# Patient Record
Sex: MALE | Race: WHITE | NOT HISPANIC OR LATINO | Employment: FULL TIME | URBAN - METROPOLITAN AREA
[De-identification: names, ages, dates, MRNs, and addresses within clinical notes are randomized per-mention and may not be internally consistent; named-entity substitution may affect disease eponyms.]

---

## 2017-03-21 ENCOUNTER — GENERIC CONVERSION - ENCOUNTER (OUTPATIENT)
Dept: OTHER | Facility: OTHER | Age: 27
End: 2017-03-21

## 2017-04-04 ENCOUNTER — ALLSCRIPTS OFFICE VISIT (OUTPATIENT)
Dept: OTHER | Facility: OTHER | Age: 27
End: 2017-04-04

## 2017-04-11 RX ORDER — EZETIMIBE 10 MG/1
10 TABLET ORAL DAILY
COMMUNITY

## 2017-04-11 RX ORDER — FEXOFENADINE HCL 180 MG/1
180 TABLET ORAL DAILY
Status: ON HOLD | COMMUNITY
End: 2017-04-20 | Stop reason: ALTCHOICE

## 2017-04-11 RX ORDER — ATORVASTATIN CALCIUM 20 MG/1
20 TABLET, FILM COATED ORAL DAILY
Status: ON HOLD | COMMUNITY
End: 2017-04-20 | Stop reason: ALTCHOICE

## 2017-04-19 ENCOUNTER — ANESTHESIA EVENT (OUTPATIENT)
Dept: PERIOP | Facility: HOSPITAL | Age: 27
End: 2017-04-19
Payer: COMMERCIAL

## 2017-04-20 ENCOUNTER — ANESTHESIA (OUTPATIENT)
Dept: PERIOP | Facility: HOSPITAL | Age: 27
End: 2017-04-20
Payer: COMMERCIAL

## 2017-04-20 ENCOUNTER — GENERIC CONVERSION - ENCOUNTER (OUTPATIENT)
Dept: OTHER | Facility: OTHER | Age: 27
End: 2017-04-20

## 2017-04-20 ENCOUNTER — HOSPITAL ENCOUNTER (OUTPATIENT)
Facility: HOSPITAL | Age: 27
Setting detail: OUTPATIENT SURGERY
Discharge: HOME | End: 2017-04-20
Attending: INTERNAL MEDICINE | Admitting: INTERNAL MEDICINE
Payer: COMMERCIAL

## 2017-04-20 VITALS
HEIGHT: 62 IN | BODY MASS INDEX: 36.35 KG/M2 | OXYGEN SATURATION: 98 % | RESPIRATION RATE: 18 BRPM | HEART RATE: 69 BPM | WEIGHT: 197.53 LBS | DIASTOLIC BLOOD PRESSURE: 71 MMHG | TEMPERATURE: 97.9 F | SYSTOLIC BLOOD PRESSURE: 113 MMHG

## 2017-04-20 DIAGNOSIS — R10.84 GENERALIZED ABDOMINAL PAIN: ICD-10-CM

## 2017-04-20 DIAGNOSIS — R10.31 ABDOMINAL PAIN, RLQ: ICD-10-CM

## 2017-04-20 DIAGNOSIS — K21.9 GERD (GASTROESOPHAGEAL REFLUX DISEASE): ICD-10-CM

## 2017-04-20 PROCEDURE — 88342 IMHCHEM/IMCYTCHM 1ST ANTB: CPT | Performed by: INTERNAL MEDICINE

## 2017-04-20 PROCEDURE — 88305 TISSUE EXAM BY PATHOLOGIST: CPT | Performed by: INTERNAL MEDICINE

## 2017-04-20 RX ORDER — SODIUM CHLORIDE, SODIUM LACTATE, POTASSIUM CHLORIDE, CALCIUM CHLORIDE 600; 310; 30; 20 MG/100ML; MG/100ML; MG/100ML; MG/100ML
125 INJECTION, SOLUTION INTRAVENOUS CONTINUOUS
Status: DISCONTINUED | OUTPATIENT
Start: 2017-04-20 | End: 2017-04-20 | Stop reason: HOSPADM

## 2017-04-20 RX ORDER — LIDOCAINE HYDROCHLORIDE 10 MG/ML
INJECTION, SOLUTION INFILTRATION; PERINEURAL AS NEEDED
Status: DISCONTINUED | OUTPATIENT
Start: 2017-04-20 | End: 2017-04-20 | Stop reason: SURG

## 2017-04-20 RX ORDER — PROPOFOL 10 MG/ML
INJECTION, EMULSION INTRAVENOUS AS NEEDED
Status: DISCONTINUED | OUTPATIENT
Start: 2017-04-20 | End: 2017-04-20 | Stop reason: SURG

## 2017-04-20 RX ORDER — PANTOPRAZOLE SODIUM 40 MG/1
40 TABLET, DELAYED RELEASE ORAL DAILY
COMMUNITY
End: 2018-03-23 | Stop reason: SDUPTHER

## 2017-04-20 RX ADMIN — PROPOFOL 50 MG: 10 INJECTION, EMULSION INTRAVENOUS at 12:18

## 2017-04-20 RX ADMIN — PROPOFOL 50 MG: 10 INJECTION, EMULSION INTRAVENOUS at 12:21

## 2017-04-20 RX ADMIN — PROPOFOL 50 MG: 10 INJECTION, EMULSION INTRAVENOUS at 12:15

## 2017-04-20 RX ADMIN — SODIUM CHLORIDE, SODIUM LACTATE, POTASSIUM CHLORIDE, AND CALCIUM CHLORIDE 125 ML/HR: .6; .31; .03; .02 INJECTION, SOLUTION INTRAVENOUS at 11:16

## 2017-04-20 RX ADMIN — PROPOFOL 150 MG: 10 INJECTION, EMULSION INTRAVENOUS at 12:09

## 2017-04-20 RX ADMIN — LIDOCAINE HYDROCHLORIDE 50 MG: 10 INJECTION, SOLUTION INFILTRATION; PERINEURAL at 12:09

## 2017-04-20 RX ADMIN — PROPOFOL 50 MG: 10 INJECTION, EMULSION INTRAVENOUS at 12:12

## 2017-04-25 ENCOUNTER — GENERIC CONVERSION - ENCOUNTER (OUTPATIENT)
Dept: OTHER | Facility: OTHER | Age: 27
End: 2017-04-25

## 2017-06-19 ENCOUNTER — ALLSCRIPTS OFFICE VISIT (OUTPATIENT)
Dept: OTHER | Facility: OTHER | Age: 27
End: 2017-06-19

## 2017-08-22 ENCOUNTER — ALLSCRIPTS OFFICE VISIT (OUTPATIENT)
Dept: OTHER | Facility: OTHER | Age: 27
End: 2017-08-22

## 2017-08-22 ENCOUNTER — GENERIC CONVERSION - ENCOUNTER (OUTPATIENT)
Dept: OTHER | Facility: OTHER | Age: 27
End: 2017-08-22

## 2017-08-27 LAB
Lab: 4.8 UG/ML
Lab: <25 NG/ML

## 2017-08-28 ENCOUNTER — GENERIC CONVERSION - ENCOUNTER (OUTPATIENT)
Dept: OTHER | Facility: OTHER | Age: 27
End: 2017-08-28

## 2017-11-22 ENCOUNTER — GENERIC CONVERSION - ENCOUNTER (OUTPATIENT)
Dept: OTHER | Facility: OTHER | Age: 27
End: 2017-11-22

## 2018-01-11 NOTE — RESULT NOTES
Verified Results  (1) TISSUE EXAM 20Apr2017 12:11PM Aide Turpin     Test Name Result Flag Reference   LAB AP CASE REPORT (Report)     Surgical Pathology Report             Case: [id number]                   Authorizing Provider: Molly Calderon MD  Collected:      04/20/2017 1211        Ordering Location:   [address] [place] [address] Received:      04/20/2017 1241                    Operating Room                                 Pathologist:      Sander Pearl MD                                Specimens:  A) - Duodenum, Duodenum, r/o celiac                                  B) - Stomach, Stomach, r/o H pylori                                  C) - Ileum, ileostomy stoma, Terminal ileum, cold bx, r/o crohns   LAB AP FINAL DIAGNOSIS (Report)     A  Duodenum, Duodenum, r/o celiac biopsy:   -Benign small intestinal mucosa with intact villi and no evidence of   increased    intraepithelial lymphocytes   -No evidence of dysplasia or malignancy  B  Stomach, Stomach, r/o H pylori biopsy:  -Benign gastric-oxyntoantral type mucosa with mild chronic inactive   gastritis   -No evidence of Paneth cell metaplasia seen   -No evidence of dysplasia or malignancy   -No H Pylori organisms identified on immunohistochemical stained slide  Control reacted appropriately        C  Ileum, ileostomy stoma, Terminal ileum, cold bx, r/o crohn's biopsy:   -Chronic active ileitis with marked chronic inflammation of lamina   propria, focal cryptitis and crypt architecture distortion   -No evidence of granuloma, dysplasia or malignancy  Clinical correlation is recommended  Electronically signed by Sander Pearl MD on 4/24/2017 at 12:35 PM   LAB AP NOTE      Interpretation performed at [address] [place] [address]   LAB [address] [facility] [address]  (Report)     These tests were developed and their performance characteristics   determined by [facility] or [facility]  They may not be cleared or approved by the [place]  Food and   Drug Administration  The FDA has determined that such clearance or   approval is not necessary  These tests are used for clinical purposes  They should not be regarded as investigational or for research  This   laboratory has been approved by CLIA 88, designated as a high-complexity   laboratory and is qualified to perform these tests  LAB AP GROSS DESCRIPTION (Report)     A  The specimen is received in formalin, labeled with the patient's name   and hospital number, and is designated duodenum, rule out celiac, is a   single irregularly shaped fragment of tan soft tissue measuring 0 5 cm in   greatest dimension  Entirely submitted  One cassette  B  The specimen is received in formalin, labeled with the patient's name   and hospital number, and is designated stomach, rule out H  pylori, are   multiple irregularly shaped fragments of tan soft tissue measuring 0 7 x   0 5 x 0 1 cm in aggregate  Entirely submitted  One cassette  C  The specimen is received in formalin, labeled with the patient's name   and hospital number, and is designated terminal ileum, cold biopsy, rule   out Crohn's, are two irregularly shaped fragments of tan soft tissue   measuring 0 5 and 0 4 cm in greatest dimension  Entirely submitted  One   cassette  Note: The estimated total formalin fixation time based upon information   provided by the submitting clinician and the standard processing schedule   is 25 75 hours        RLR   LAB AP CLINICAL INFORMATION Cold bx r/o celiac     Cold bx r/o celiac

## 2018-01-12 VITALS
DIASTOLIC BLOOD PRESSURE: 70 MMHG | SYSTOLIC BLOOD PRESSURE: 115 MMHG | HEART RATE: 80 BPM | WEIGHT: 204.38 LBS | BODY MASS INDEX: 37.61 KG/M2 | HEIGHT: 62 IN

## 2018-01-12 VITALS
SYSTOLIC BLOOD PRESSURE: 100 MMHG | HEART RATE: 80 BPM | HEIGHT: 62 IN | DIASTOLIC BLOOD PRESSURE: 72 MMHG | WEIGHT: 203.38 LBS | BODY MASS INDEX: 37.43 KG/M2

## 2018-01-13 VITALS
HEIGHT: 62 IN | SYSTOLIC BLOOD PRESSURE: 110 MMHG | BODY MASS INDEX: 37.17 KG/M2 | WEIGHT: 202 LBS | DIASTOLIC BLOOD PRESSURE: 72 MMHG

## 2018-01-18 NOTE — RESULT NOTES
Verified Results  (LC) Adalimumab + Ab (Serial Monitor) [id number] 01:55PM Ruby Escobar     Test Name Result Flag Reference   Anti- Adalimumab Antibody <25 ng/mL     Interpretation:  Anti-adalimumab antibodies are UNDETECTED  Quantitation Limit: <25 ng/mL  Results of 25 or higher indicate detection of anti-  adalimumab antibodies  25 - 100 ng/mL: LOW antibody titer, little/no apparent                        reduction in free drug level  101 - 300 ng/mL: INTERMEDIATE antibody titer, variable                        reduction in free drug level  301 or greater ng/mL: HIGH antibody titer, notably                        diminished or absent free drug level  Comments:  - Anti-drug antibodies may develop at any time during the    course of biological therapy  - Low titer anti-drug antibodies may have little or no    effect on drug levels or efficacy  Antibodies in low    titers may be transient and disappear overtime (5,6) or    they may progress to higher titers (6)  - High titers are likely to be more consequential, leading    to loss of drug efficacy by preventing drug binding to    TNF and/or increasing drug clearance (6,7)  - This anti-adalimumab antibody assay is not impeded by the    presence of adalimumab in serum and all positive antibody    results are verified by a confirmatory test   References:  1  Michelle DIA, et al  Clin Gastroenterol Hepatol 0751;36:60-     80   2  Mazor Y, et al  Aliment Pharmacol Ther 8710;94:174-040  3  Krysta E, et al  J Crohns Col 2016;10(5):510-515  4  Laurel PIERSON, et al  Jewelene Trish Rheum Dis 9993;85:362-617   5  Blaise C, et al  Inflamm Bowel Dis 2012;18(12):2209-     2217   6  Sue Vieira et al  Am J Gastroenterol 6998;653:971-     [facility] et al  Clin Gastroenterol Hepatol 3172;99(6):     375-369    These tests were developed and their performance  characteristics determined by [facility]   They have not been  cleared or approved by the Food and Drug Administration  However, both drug and anti-drug antibody assays have been  developed and validated in accordance with FDA Guidance  for Industry documents: Bioanalytical Method Validation  (2013) and Assay Development and Validation for  Immunogenicity Testing of Therapeutic Protein Products  (2016)  Adalimumab Drug Level 4 8 ug/mL     In the presence of serum anti-adalimumab antibodies, the  adalimumab drug level reflects the antibody-unbound (free)  fraction of adalimumab in serum  Quantitation Limit: <0 6 ug/mL  Results of 0 6 or higher indicate detection of adalimumab  COMMENTS:  - The optimal drug concentration depends upon patient-    specific factors including the disease and desired    therapeutic endpoint   - Target trough concentrations > 4 9 and > 5 9 ug/mL have    been studied in inflammatory bowel disease    (1,2)  - A trough level > 8 14 ug/mL has been proposed for mucosal    healing in CD (3)  - In rheumatoid arthritis, trough levels of 5 to 8 ug/mL    are associated with clinical response (EULAR)(4)

## 2018-01-22 VITALS
HEART RATE: 84 BPM | WEIGHT: 205.25 LBS | BODY MASS INDEX: 37.77 KG/M2 | SYSTOLIC BLOOD PRESSURE: 110 MMHG | HEIGHT: 62 IN | DIASTOLIC BLOOD PRESSURE: 78 MMHG

## 2018-02-08 ENCOUNTER — TELEPHONE (OUTPATIENT)
Dept: GASTROENTEROLOGY | Facility: CLINIC | Age: 28
End: 2018-02-08

## 2018-03-12 RX ORDER — PREDNISONE 20 MG/1
1 TABLET ORAL DAILY
COMMUNITY
Start: 2017-06-19 | End: 2018-03-15

## 2018-03-12 RX ORDER — HYDROCODONE BITARTRATE AND ACETAMINOPHEN 7.5; 3 MG/1; MG/1
TABLET ORAL
Refills: 0 | COMMUNITY
Start: 2018-02-20 | End: 2022-01-13

## 2018-03-12 RX ORDER — FEXOFENADINE HYDROCHLORIDE 60 MG/1
TABLET, FILM COATED ORAL AS NEEDED
COMMUNITY

## 2018-03-15 ENCOUNTER — OFFICE VISIT (OUTPATIENT)
Dept: GASTROENTEROLOGY | Facility: CLINIC | Age: 28
End: 2018-03-15
Payer: COMMERCIAL

## 2018-03-15 VITALS
DIASTOLIC BLOOD PRESSURE: 88 MMHG | WEIGHT: 208.13 LBS | SYSTOLIC BLOOD PRESSURE: 112 MMHG | BODY MASS INDEX: 38.07 KG/M2 | HEART RATE: 84 BPM

## 2018-03-15 DIAGNOSIS — K21.9 GERD WITHOUT ESOPHAGITIS: ICD-10-CM

## 2018-03-15 DIAGNOSIS — Z09 FOLLOW UP: Primary | ICD-10-CM

## 2018-03-15 DIAGNOSIS — K50.018 CROHN'S DISEASE OF ILEUM, OTHER COMPLICATION (HCC): ICD-10-CM

## 2018-03-15 PROCEDURE — 99214 OFFICE O/P EST MOD 30 MIN: CPT | Performed by: INTERNAL MEDICINE

## 2018-03-15 NOTE — PROGRESS NOTES
Follow-up Note -  [facility]  Alex Rojas 1990 28 y o  male     ASSESSMENT @ PLAN:  He is a 30-year-old male  With small bowel Crohn's who was in remission and doing very well on his medication regimen  In addition he has stable gastroesophageal reflux disease on Protonix  He does have food triggers  He had mild irritation after shoulder surgery  1 he will continue on the adalimumab shot weekly    2 he will continue on the Protonix 40 mg daily  He is off steroids and is doing well and is stable and he will come back in 6 months  Reason:  Crohn's disease and acid reflux    HPI:  He is a 58-year-old male with gastroesophageal reflux disease and small bowel Crohn's who was doing very well  He is on the adalimumab shot every week and going from every other week to every week is helped him tremendously  He has less abdominal pain and more normal bowel movements  He has no melena or hematochezia  He has had mild increase in abdominal pain since having his shoulder surgery 3 weeks ago  He has no fevers chills nausea vomiting heartburn or dysphagia  He is on the pantoprazole  His reflux is under control  He clearly has food triggers  His weight is stable  In fact he is obese and we went over this at length  He is here with his mother  REVIEW OF SYSTEMS:    CONSTITUTIONAL: Denies any fever, chills, or rigors  Good appetite, and no recent weight loss  HEENT: No earache or tinnitus  Denies hearing loss or visual disturbances  CARDIOVASCULAR: No chest pain or palpitations  RESPIRATORY: Denies any cough, hemoptysis, shortness of breath or dyspnea on exertion  GASTROINTESTINAL: As noted in the History of Present Illness  GENITOURINARY: No problems with urination  Denies any hematuria or dysuria  NEUROLOGIC: No dizziness or vertigo, denies headaches  MUSCULOSKELETAL: Denies any muscle or joint pain  SKIN: Denies skin rashes or itching     ENDOCRINE: Denies excessive thirst  Denies intolerance to heat or cold  PSYCHOSOCIAL: Denies depression or anxiety  Denies any recent memory loss  Past Medical History:   Diagnosis Date    GERD (gastroesophageal reflux disease)     H/O angina pectoris     Hyperlipidemia     Sleep apnea       Past Surgical History:   Procedure Laterality Date    CARPAL TUNNEL RELEASE      CHOLECYSTECTOMY      HERNIA REPAIR      [place] ESOPHAGOGASTRODUODENOSCOPY TRANSORAL DIAGNOSTIC N/A 4/20/2017    Procedure: EGD AND COLONOSCOPY;  Surgeon: Timbo Mccabe MD;  Location: [facility]; Service: Gastroenterology    SHOULDER SURGERY Right      Social History     Social History    Marital status: Single     Spouse name: N/A    Number of children: N/A    Years of education: N/A     Occupational History    Not on file  Social History Main Topics    Smoking status: Never Smoker    Smokeless tobacco: Never Used    Alcohol use No    Drug use: No    Sexual activity: Not on file     Other Topics Concern    Not on file     Social History Narrative    No narrative on file     Family History   Problem Relation Age of Onset    Diabetes Mother      Aluminum; Bee venom; Ciprofloxacin; Clindamycin; Doxycycline; and Percocet [oxycodone-acetaminophen]  Current Outpatient Prescriptions   Medication Sig Dispense Refill    Adalimumab (HUMIRA PEN) 40 MG/0 8ML PNKT Inject under the skin      ezetimibe (ZETIA) 10 mg tablet Take 10 mg by mouth daily      fexofenadine (ALLEGRA) 60 MG tablet Take by mouth      pantoprazole (PROTONIX) 40 mg tablet Take 40 mg by mouth daily      HYDROcodone-acetaminophen (XODOL) 7 5-300 MG per tablet TAKE 1 TABLET EVERY FOUR TO SIX HOURS AS NEEDED  0    predniSONE 20 mg tablet Take 1 tablet by mouth daily       No current facility-administered medications for this visit  Blood pressure 112/88, pulse 84, weight 94 4 kg (208 lb 2 oz)      PHYSICAL EXAM:     General Appearance:   Alert, cooperative, no distress, appears stated age    HEENT:   Normocephalic, atraumatic, anicteric      Neck:  Supple, symmetrical, trachea midline, no adenopathy;    thyroid: no enlargement/tenderness/nodules; no carotid  bruit or JVD    Lungs:   Clear to auscultation bilaterally; no rales, rhonchi or wheezing; respirations unlabored    Heart[de-identified]   S1 and S2 normal; regular rate and rhythm; no murmur, rub, or gallop     Abdomen:   Soft, non-tender, non-distended; normal bowel sounds; no masses, no organomegaly    Genitalia:   Deferred    Rectal:   Deferred    Extremities:  No cyanosis, clubbing or edema    Pulses:  2+ and symmetric all extremities    Skin:  Skin color, texture, turgor normal, no rashes or lesions    Lymph nodes:  No palpable cervical, axillary or inguinal lymphadenopathy        No results found for: WBC, HGB, HCT, MCV, PLT  No results found for: GLUCOSE, CALCIUM, NA, K, CO2, CL, BUN, CREATININE  No results found for: ALT, AST, GGT, ALKPHOS, BILITOT  No results found for: INR, PROTIME

## 2018-03-15 NOTE — LETTER
March 15, 2018     Monique Vega, [address]  [facility] [address]    Patient: Susy Neumann   YOB: 1990   Date of Visit: 3/15/2018       Dear Dr Yomaira Marvin:    Thank you for referring Chase Barriga to me for evaluation  Below are my notes for this consultation  If you have questions, please do not hesitate to call me  I look forward to following your patient along with you  Sincerely,        Cherie Alcazar MD        CC: No Recipients  Cherie Alcazar MD  3/15/2018  2:39 PM  Sign at close encounter  Follow-up Note - [address] [facility]  Susy Neumann 1990 28 y o  male     ASSESSMENT @ PLAN:  He is a 79-year-old male  With small bowel Crohn's who was in remission and doing very well on his medication regimen  In addition he has stable gastroesophageal reflux disease on Protonix  He does have food triggers  He had mild irritation after shoulder surgery  1 he will continue on the adalimumab shot weekly    2 he will continue on the Protonix 40 mg daily  He is off steroids and is doing well and is stable and he will come back in 6 months  Reason:  Crohn's disease and acid reflux    HPI:  He is a 79-year-old male with gastroesophageal reflux disease and small bowel Crohn's who was doing very well  He is on the adalimumab shot every week and going from every other week to every week is helped him tremendously  He has less abdominal pain and more normal bowel movements  He has no melena or hematochezia  He has had mild increase in abdominal pain since having his shoulder surgery 3 weeks ago  He has no fevers chills nausea vomiting heartburn or dysphagia  He is on the pantoprazole  His reflux is under control  He clearly has food triggers  His weight is stable  In fact he is obese and we went over this at length  He is here with his mother  REVIEW OF SYSTEMS:    CONSTITUTIONAL: Denies any fever, chills, or rigors   Good appetite, and no recent weight loss  HEENT: No earache or tinnitus  Denies hearing loss or visual disturbances  CARDIOVASCULAR: No chest pain or palpitations  RESPIRATORY: Denies any cough, hemoptysis, shortness of breath or dyspnea on exertion  GASTROINTESTINAL: As noted in the History of Present Illness  GENITOURINARY: No problems with urination  Denies any hematuria or dysuria  NEUROLOGIC: No dizziness or vertigo, denies headaches  MUSCULOSKELETAL: Denies any muscle or joint pain  SKIN: Denies skin rashes or itching  ENDOCRINE: Denies excessive thirst  Denies intolerance to heat or cold  PSYCHOSOCIAL: Denies depression or anxiety  Denies any recent memory loss  Past Medical History:   Diagnosis Date    GERD (gastroesophageal reflux disease)     H/O angina pectoris     Hyperlipidemia     Sleep apnea       Past Surgical History:   Procedure Laterality Date    CARPAL TUNNEL RELEASE      CHOLECYSTECTOMY      HERNIA REPAIR      [place] ESOPHAGOGASTRODUODENOSCOPY TRANSORAL DIAGNOSTIC N/A 4/20/2017    Procedure: EGD AND COLONOSCOPY;  Surgeon: Sharon Hendrix MD;  Location: [facility]; Service: Gastroenterology    SHOULDER SURGERY Right      Social History     Social History    Marital status: Single     Spouse name: N/A    Number of children: N/A    Years of education: N/A     Occupational History    Not on file  Social History Main Topics    Smoking status: Never Smoker    Smokeless tobacco: Never Used    Alcohol use No    Drug use: No    Sexual activity: Not on file     Other Topics Concern    Not on file     Social History Narrative    No narrative on file     Family History   Problem Relation Age of Onset    Diabetes Mother      Aluminum; Bee venom; Ciprofloxacin; Clindamycin;  Doxycycline; and Percocet [oxycodone-acetaminophen]  Current Outpatient Prescriptions   Medication Sig Dispense Refill    Adalimumab (HUMIRA PEN) 40 MG/0 8ML PNKT Inject under the skin      ezetimibe (ZETIA) 10 mg tablet Take 10 mg by mouth daily      fexofenadine (ALLEGRA) 60 MG tablet Take by mouth      pantoprazole (PROTONIX) 40 mg tablet Take 40 mg by mouth daily      HYDROcodone-acetaminophen (XODOL) 7 5-300 MG per tablet TAKE 1 TABLET EVERY FOUR TO SIX HOURS AS NEEDED  0    predniSONE 20 mg tablet Take 1 tablet by mouth daily       No current facility-administered medications for this visit  Blood pressure 112/88, pulse 84, weight 94 4 kg (208 lb 2 oz)  PHYSICAL EXAM:     General Appearance:   Alert, cooperative, no distress, appears stated age    HEENT:   Normocephalic, atraumatic, anicteric      Neck:  Supple, symmetrical, trachea midline, no adenopathy;    thyroid: no enlargement/tenderness/nodules; no carotid  bruit or JVD    Lungs:   Clear to auscultation bilaterally; no rales, rhonchi or wheezing; respirations unlabored    Heart[de-identified]   S1 and S2 normal; regular rate and rhythm; no murmur, rub, or gallop     Abdomen:   Soft, non-tender, non-distended; normal bowel sounds; no masses, no organomegaly    Genitalia:   Deferred    Rectal:   Deferred    Extremities:  No cyanosis, clubbing or edema    Pulses:  2+ and symmetric all extremities    Skin:  Skin color, texture, turgor normal, no rashes or lesions    Lymph nodes:  No palpable cervical, axillary or inguinal lymphadenopathy        No results found for: WBC, HGB, HCT, MCV, PLT  No results found for: GLUCOSE, CALCIUM, NA, K, CO2, CL, BUN, CREATININE  No results found for: ALT, AST, GGT, ALKPHOS, BILITOT  No results found for: INR, PROTIME

## 2018-03-23 DIAGNOSIS — K21.9 GASTROESOPHAGEAL REFLUX DISEASE, ESOPHAGITIS PRESENCE NOT SPECIFIED: Primary | ICD-10-CM

## 2018-03-23 RX ORDER — PANTOPRAZOLE SODIUM 40 MG/1
40 TABLET, DELAYED RELEASE ORAL DAILY
Qty: 90 TABLET | Refills: 0 | Status: SHIPPED | OUTPATIENT
Start: 2018-03-23 | End: 2018-07-07 | Stop reason: SDUPTHER

## 2018-03-29 ENCOUNTER — APPOINTMENT (EMERGENCY)
Dept: RADIOLOGY | Facility: HOSPITAL | Age: 28
End: 2018-03-29
Payer: COMMERCIAL

## 2018-03-29 ENCOUNTER — APPOINTMENT (EMERGENCY)
Dept: CT IMAGING | Facility: HOSPITAL | Age: 28
End: 2018-03-29
Payer: COMMERCIAL

## 2018-03-29 ENCOUNTER — HOSPITAL ENCOUNTER (EMERGENCY)
Facility: HOSPITAL | Age: 28
Discharge: HOME | End: 2018-03-29
Attending: EMERGENCY MEDICINE | Admitting: EMERGENCY MEDICINE
Payer: COMMERCIAL

## 2018-03-29 VITALS
TEMPERATURE: 97.8 F | BODY MASS INDEX: 37.17 KG/M2 | HEIGHT: 62 IN | WEIGHT: 202 LBS | SYSTOLIC BLOOD PRESSURE: 134 MMHG | OXYGEN SATURATION: 95 % | DIASTOLIC BLOOD PRESSURE: 68 MMHG | RESPIRATION RATE: 20 BRPM | HEART RATE: 109 BPM

## 2018-03-29 DIAGNOSIS — R07.9 CHEST PAIN: Primary | ICD-10-CM

## 2018-03-29 DIAGNOSIS — J40 BRONCHITIS: ICD-10-CM

## 2018-03-29 DIAGNOSIS — R06.02 SHORTNESS OF BREATH: ICD-10-CM

## 2018-03-29 LAB
ALBUMIN SERPL BCP-MCNC: 4.2 G/DL (ref 3.5–5)
ALP SERPL-CCNC: 54 U/L (ref 46–116)
ALT SERPL W P-5'-P-CCNC: 141 U/L (ref 12–78)
ANION GAP SERPL CALCULATED.3IONS-SCNC: 12 MMOL/L (ref 4–13)
AST SERPL W P-5'-P-CCNC: 73 U/L (ref 5–45)
ATRIAL RATE: 108 BPM
BASOPHILS # BLD AUTO: 0.04 THOUSANDS/ΜL (ref 0–0.1)
BASOPHILS NFR BLD AUTO: 0 % (ref 0–1)
BILIRUB SERPL-MCNC: 1 MG/DL (ref 0.2–1)
BUN SERPL-MCNC: 14 MG/DL (ref 5–25)
CALCIUM SERPL-MCNC: 9.2 MG/DL (ref 8.3–10.1)
CHLORIDE SERPL-SCNC: 98 MMOL/L (ref 100–108)
CO2 SERPL-SCNC: 26 MMOL/L (ref 21–32)
CREAT SERPL-MCNC: 0.87 MG/DL (ref 0.6–1.3)
DEPRECATED D DIMER PPP: 477 NG/ML (FEU) (ref 0–424)
EOSINOPHIL # BLD AUTO: 0.08 THOUSAND/ΜL (ref 0–0.61)
EOSINOPHIL NFR BLD AUTO: 1 % (ref 0–6)
ERYTHROCYTE [DISTWIDTH] IN BLOOD BY AUTOMATED COUNT: 12.9 % (ref 11.6–15.1)
GFR SERPL CREATININE-BSD FRML MDRD: 117 ML/MIN/1.73SQ M
GLUCOSE SERPL-MCNC: 90 MG/DL (ref 65–140)
HCT VFR BLD AUTO: 47 % (ref 36.5–49.3)
HGB BLD-MCNC: 16.5 G/DL (ref 12–17)
LYMPHOCYTES # BLD AUTO: 1.72 THOUSANDS/ΜL (ref 0.6–4.47)
LYMPHOCYTES NFR BLD AUTO: 17 % (ref 14–44)
MCH RBC QN AUTO: 28.9 PG (ref 26.8–34.3)
MCHC RBC AUTO-ENTMCNC: 35.1 G/DL (ref 31.4–37.4)
MCV RBC AUTO: 83 FL (ref 82–98)
MONOCYTES # BLD AUTO: 1 THOUSAND/ΜL (ref 0.17–1.22)
MONOCYTES NFR BLD AUTO: 10 % (ref 4–12)
NEUTROPHILS # BLD AUTO: 7.56 THOUSANDS/ΜL (ref 1.85–7.62)
NEUTS SEG NFR BLD AUTO: 72 % (ref 43–75)
NRBC BLD AUTO-RTO: 0 /100 WBCS
P AXIS: 30 DEGREES
PLATELET # BLD AUTO: 266 THOUSANDS/UL (ref 149–390)
PMV BLD AUTO: 9.4 FL (ref 8.9–12.7)
POTASSIUM SERPL-SCNC: 4.1 MMOL/L (ref 3.5–5.3)
PR INTERVAL: 144 MS
PROT SERPL-MCNC: 8.5 G/DL (ref 6.4–8.2)
QRS AXIS: 52 DEGREES
QRSD INTERVAL: 78 MS
QT INTERVAL: 300 MS
QTC INTERVAL: 402 MS
RBC # BLD AUTO: 5.7 MILLION/UL (ref 3.88–5.62)
SODIUM SERPL-SCNC: 136 MMOL/L (ref 136–145)
T WAVE AXIS: 29 DEGREES
TROPONIN I SERPL-MCNC: <0.02 NG/ML
VENTRICULAR RATE: 108 BPM
WBC # BLD AUTO: 10.43 THOUSAND/UL (ref 4.31–10.16)

## 2018-03-29 PROCEDURE — 93010 ELECTROCARDIOGRAM REPORT: CPT | Performed by: INTERNAL MEDICINE

## 2018-03-29 PROCEDURE — 85379 FIBRIN DEGRADATION QUANT: CPT | Performed by: EMERGENCY MEDICINE

## 2018-03-29 PROCEDURE — 36415 COLL VENOUS BLD VENIPUNCTURE: CPT | Performed by: EMERGENCY MEDICINE

## 2018-03-29 PROCEDURE — 93005 ELECTROCARDIOGRAM TRACING: CPT

## 2018-03-29 PROCEDURE — 96361 HYDRATE IV INFUSION ADD-ON: CPT

## 2018-03-29 PROCEDURE — 71046 X-RAY EXAM CHEST 2 VIEWS: CPT

## 2018-03-29 PROCEDURE — 85025 COMPLETE CBC W/AUTO DIFF WBC: CPT | Performed by: EMERGENCY MEDICINE

## 2018-03-29 PROCEDURE — 96360 HYDRATION IV INFUSION INIT: CPT

## 2018-03-29 PROCEDURE — 99285 EMERGENCY DEPT VISIT HI MDM: CPT

## 2018-03-29 PROCEDURE — 80053 COMPREHEN METABOLIC PANEL: CPT | Performed by: EMERGENCY MEDICINE

## 2018-03-29 PROCEDURE — 84484 ASSAY OF TROPONIN QUANT: CPT | Performed by: EMERGENCY MEDICINE

## 2018-03-29 PROCEDURE — 71275 CT ANGIOGRAPHY CHEST: CPT

## 2018-03-29 RX ORDER — ALBUTEROL SULFATE 90 UG/1
2 AEROSOL, METERED RESPIRATORY (INHALATION) EVERY 4 HOURS PRN
Qty: 1 INHALER | Refills: 0 | Status: SHIPPED | OUTPATIENT
Start: 2018-03-29

## 2018-03-29 RX ORDER — IPRATROPIUM BROMIDE AND ALBUTEROL SULFATE 2.5; .5 MG/3ML; MG/3ML
3 SOLUTION RESPIRATORY (INHALATION) ONCE
Status: COMPLETED | OUTPATIENT
Start: 2018-03-29 | End: 2018-03-29

## 2018-03-29 RX ADMIN — IPRATROPIUM BROMIDE AND ALBUTEROL SULFATE 3 ML: .5; 3 SOLUTION RESPIRATORY (INHALATION) at 18:21

## 2018-03-29 RX ADMIN — IOHEXOL 85 ML: 350 INJECTION, SOLUTION INTRAVENOUS at 19:41

## 2018-03-29 RX ADMIN — SODIUM CHLORIDE 1000 ML: 0.9 INJECTION, SOLUTION INTRAVENOUS at 17:38

## 2018-03-29 NOTE — ED PROVIDER NOTES
History  Chief Complaint   Patient presents with    Chest Pain     pt with chest pain and SOB since this morning      23-year-old male presents today complaining of chest pain and shortness of breath which started last night  Symptoms have been constant since onset  States he had this once before when he had bronchitis  History provided by:  Patient  Chest Pain   Pain quality: pressure and tightness    Pain radiates to:  Does not radiate  Pain radiates to the back: no    Pain severity:  Moderate  Onset quality:  Sudden  Duration:  24 hours  Timing:  Constant  Progression:  Unchanged  Chronicity:  Recurrent  Context: breathing    Relieved by:  None tried  Worsened by:  Nothing tried  Ineffective treatments:  None tried  Associated symptoms: cough and shortness of breath    Associated symptoms: no abdominal pain, no AICD problem, no altered mental status, no anorexia, no back pain, no diaphoresis, no dizziness, no dysphagia, no fatigue, no fever, no headache, no lower extremity edema, no nausea, no numbness, no palpitations and no weakness    Risk factors: high cholesterol, male sex and obesity    Risk factors: no aortic disease, no coronary artery disease, no diabetes mellitus, no immobilization, no prior DVT/PE and no smoking        Prior to Admission Medications   Prescriptions Last Dose Informant Patient Reported? Taking?    Adalimumab (HUMIRA PEN) 40 MG/0 8ML PNKT  Self Yes No   Sig: Inject under the skin   HYDROcodone-acetaminophen (XODOL) 7 5-300 MG per tablet  Self Yes No   Sig: TAKE 1 TABLET EVERY FOUR TO SIX HOURS AS NEEDED   ezetimibe (ZETIA) 10 mg tablet  Self Yes No   Sig: Take 10 mg by mouth daily   fexofenadine (ALLEGRA) 60 MG tablet  Self Yes No   Sig: Take by mouth   pantoprazole (PROTONIX) 40 mg tablet   No No   Sig: Take 1 tablet (40 mg total) by mouth daily for 90 days      Facility-Administered Medications: None       Past Medical History:   Diagnosis Date    GERD (gastroesophageal reflux disease)     H/O angina pectoris     Hyperlipidemia     Sleep apnea        Past Surgical History:   Procedure Laterality Date    CARPAL TUNNEL RELEASE      CHOLECYSTECTOMY      HERNIA REPAIR      [place] ESOPHAGOGASTRODUODENOSCOPY TRANSORAL DIAGNOSTIC N/A 4/20/2017    Procedure: EGD AND COLONOSCOPY;  Surgeon: Dania Ngo MD;  Location: [facility]; Service: Gastroenterology    SHOULDER SURGERY Right        Family History   Problem Relation Age of Onset    Diabetes Mother      I have reviewed and agree with the history as documented  Social History   Substance Use Topics    Smoking status: Never Smoker    Smokeless tobacco: Never Used    Alcohol use No        Review of Systems   Constitutional: Negative for diaphoresis, fatigue and fever  HENT: Negative for congestion and trouble swallowing  Eyes: Negative for visual disturbance  Respiratory: Positive for cough, chest tightness and shortness of breath  Cardiovascular: Positive for chest pain  Negative for palpitations  Gastrointestinal: Negative for abdominal pain, anorexia and nausea  Genitourinary: Negative for difficulty urinating  Musculoskeletal: Negative for back pain  Skin: Negative for pallor, rash and wound  Neurological: Negative for dizziness, weakness, numbness and headaches  Psychiatric/Behavioral: Negative for confusion         Physical Exam  ED Triage Vitals [03/29/18 1632]   Temperature Pulse Respirations Blood Pressure SpO2   97 8 °F (36 6 °C) (!) 108 16 159/90 98 %      Temp Source Heart Rate Source Patient Position - Orthostatic VS BP Location FiO2 (%)   Oral Monitor Sitting Left arm --      Pain Score       8           Orthostatic Vital Signs  Vitals:    03/29/18 1800 03/29/18 1900 03/29/18 1930 03/29/18 2101   BP: 123/77 126/85 124/67 134/68   Pulse: (!) 106 (!) 128 (!) 126 (!) 109   Patient Position - Orthostatic VS: Lying Lying Lying Lying       Physical Exam   Constitutional: He is oriented to person, place, and time  He appears well-developed and well-nourished  He appears distressed (appears anxious)  HENT:   Head: Normocephalic and atraumatic  Mouth/Throat: Uvula is midline, oropharynx is clear and moist and mucous membranes are normal  No tonsillar exudate  Eyes: Pupils are equal, round, and reactive to light  Neck: Normal range of motion  Neck supple  Cardiovascular: Normal rate and regular rhythm  Pulmonary/Chest: Effort normal and breath sounds normal    Abdominal: Soft  Bowel sounds are normal  There is no tenderness  There is no rebound and no guarding  Musculoskeletal: Normal range of motion  Neurological: He is alert and oriented to person, place, and time  Patient moving all extremities equally, no focal neuro deficits noted  Skin: Skin is warm and dry  Psychiatric: He has a normal mood and affect  Nursing note and vitals reviewed        ED Medications  Medications   ipratropium-albuterol (DUO-NEB) 0 5-2 5 mg/3 mL inhalation solution 3 mL (3 mL Nebulization Given 3/29/18 1821)   sodium chloride 0 9 % bolus 1,000 mL (0 mL Intravenous Stopped 3/29/18 2051)   iohexol (OMNIPAQUE) 350 MG/ML injection (MULTI-DOSE) 85 mL (85 mL Intravenous Given 3/29/18 1941)       Diagnostic Studies  Results Reviewed     Procedure Component Value Units Date/Time    Comprehensive metabolic panel [[id number]]  (Abnormal) Collected:  03/29/18 1726    Lab Status:  Final result Specimen:  Blood from Arm, Right Updated:  03/29/18 1815     Sodium 136 mmol/L      Potassium 4 1 mmol/L      Chloride 98 (L) mmol/L      CO2 26 mmol/L      Anion Gap 12 mmol/L      BUN 14 mg/dL      Creatinine 0 87 mg/dL      Glucose 90 mg/dL      Calcium 9 2 mg/dL      AST 73 (H) U/L       (H) U/L      Alkaline Phosphatase 54 U/L      Total Protein 8 5 (H) g/dL      Albumin 4 2 g/dL      Total Bilirubin 1 00 mg/dL      eGFR 117 ml/min/1 73sq m     Narrative:         National Kidney Disease Education Program recommendations are as follows:  GFR calculation is accurate only with a steady state creatinine  Chronic Kidney disease less than 60 ml/min/1 73 sq  meters  Kidney failure less than 15 ml/min/1 73 sq  meters  Troponin I [[id number]]  (Normal) Collected:  03/29/18 1726    Lab Status:  Final result Specimen:  Blood from Arm, Right Updated:  03/29/18 1814     Troponin I <0 02 ng/mL     Narrative:         [employer] Chemistry analyzer 99% cutoff is > 0 04 ng/mL in network labs    o cTnI 99% cutoff is useful only when applied to patients in the clinical setting of myocardial ischemia  o cTnI 99% cutoff should be interpreted in the context of clinical history, ECG findings and possibly cardiac imaging to establish correct diagnosis  o cTnI 99% cutoff may be suggestive but clearly not indicative of a coronary event without the clinical setting of myocardial ischemia      D-Dimer [[id number]]  (Abnormal) Collected:  03/29/18 1726    Lab Status:  Final result Specimen:  Blood from Arm, Right Updated:  03/29/18 1747     D-Dimer, Quant 477 (H) ng/ml (FEU)     CBC and differential [[id number]]  (Abnormal) Collected:  03/29/18 1726    Lab Status:  Final result Specimen:  Blood from Arm, Right Updated:  03/29/18 1733     WBC 10 43 (H) Thousand/uL      RBC 5 70 (H) Million/uL      Hemoglobin 16 5 g/dL      Hematocrit 47 0 %      MCV 83 fL      MCH 28 9 pg      MCHC 35 1 g/dL      RDW 12 9 %      MPV 9 4 fL      Platelets 184 Thousands/uL      nRBC 0 /100 WBCs      Neutrophils Relative 72 %      Lymphocytes Relative 17 %      Monocytes Relative 10 %      Eosinophils Relative 1 %      Basophils Relative 0 %      Neutrophils Absolute 7 56 Thousands/µL      Lymphocytes Absolute 1 72 Thousands/µL      Monocytes Absolute 1 00 Thousand/µL      Eosinophils Absolute 0 08 Thousand/µL      Basophils Absolute 0 04 Thousands/µL                  CTA ED chest PE study   Final Result by Ray Servin MD (03/29 1949)      No acute finding; specifically, no pulmonary arterial embolism as visualized  Somewhat limited for PE evaluation secondary to nonoptimal opacification and motion artifact  Workstation performed: [id number]         XR chest 2 views   Final Result by Destinee Corbin MD (03/29 4437)      No acute cardiopulmonary disease  Workstation performed: [id number]                    Procedures  ECG 12 Lead Documentation  Date/Time: 3/29/2018 5:44 PM  Performed by: Sherri Encarnacion  Authorized by: Sherri Encarnacion     Indications / Diagnosis:  Chest pain  ECG reviewed by me, the ED Provider: yes    Patient location:  ED  Comments:      Sinus tachycardia at 108 beats per minute  Normal axis, normal intervals, no ST T wave changes consistent with ischemia  No old available for comparison  Phone Contacts  ED Phone Contact    ED Course  ED Course                                MDM  Number of Diagnoses or Management Options  Bronchitis: new and requires workup  Chest pain: new and requires workup  Shortness of breath: new and requires workup  Diagnosis management comments: 5:46 PM  Patient presents with chest pain  We will obtain the following test: CBC, CMP, Troponin, EKG, and CXR  I will re-evaluate the patient and monitor their status  1826 PM  All testing reviewed  First troponin is negative  EKG is non-diagnostic  Chest x-ray is negative for acute pathology  D-dimer mildly elevated at 477  Will sent for CT rule out PE  Currently getting DuoNeb treatment  8:51 PM  CT PE negative  Feeling better after nebulizer  Will d/c home           Amount and/or Complexity of Data Reviewed  Clinical lab tests: ordered and reviewed  Tests in the radiology section of CPT®: ordered and reviewed  Tests in the medicine section of CPT®: reviewed and ordered  Decide to obtain previous medical records or to obtain history from someone other than the patient: yes  Review and summarize past medical records: yes  Independent visualization of images, tracings, or specimens: yes    Risk of Complications, Morbidity, and/or Mortality  Presenting problems: high  Diagnostic procedures: high  Management options: high    Patient Progress  Patient progress: stable    CritCare Time    Disposition  Final diagnoses:   Chest pain   Shortness of breath   Bronchitis     Time reflects when diagnosis was documented in both MDM as applicable and the Disposition within this note     Time User Action Codes Description Comment    3/29/2018  5:45 PM Garon Gearing Add [R07 9] Chest pain     3/29/2018  5:45 PM Garon Gearing Add [R06 02] Shortness of breath     3/29/2018  8:48 PM Cleve Mae Útw[address] 44  Bronchitis       ED Disposition     ED Disposition Condition Comment    Discharge  [place] discharge to home/self care      Condition at discharge: Stable        Follow-up Information     Follow up With Specialties Details Why Contact Info Additional Information    Gracie Rene MD Family Medicine Schedule an appointment as soon as possible for a visit  1 [address] [facility] Emergency Medicine  If symptoms worsen [address] [place] [address]  [phone] [facility], [address] [place] [address], [facility]        Discharge Medication List as of 3/29/2018  8:50 PM      START taking these medications    Details   albuterol (PROVENTIL HFA,VENTOLIN HFA) 90 mcg/act inhaler Inhale 2 puffs every 4 (four) hours as needed for shortness of breath, Starting Thu 3/29/2018, Print         CONTINUE these medications which have NOT CHANGED    Details   Adalimumab (HUMIRA PEN) 40 MG/0 8ML PNKT Inject under the skin, Starting Tue 6/27/2017, Historical Med      ezetimibe (ZETIA) 10 mg tablet Take 10 mg by mouth daily, Historical Med      fexofenadine (ALLEGRA) 60 MG tablet Take by mouth, Historical Med      HYDROcodone-acetaminophen (XODOL) 7 5-300 MG per tablet TAKE 1 TABLET EVERY FOUR TO SIX HOURS AS NEEDED, Historical Med      pantoprazole (PROTONIX) 40 mg tablet Take 1 tablet (40 mg total) by mouth daily for 90 days, Starting Fri 3/23/2018, Until Thu 6/21/2018, Normal           No discharge procedures on file      ED Provider  Electronically Signed by           Shala Cotter DO  03/29/18 1226

## 2018-03-30 NOTE — DISCHARGE INSTRUCTIONS
Acute Bronchitis   WHAT YOU NEED TO KNOW:   Acute bronchitis is swelling and irritation in the air passages of your lungs  This irritation may cause you to cough or have other breathing problems  Acute bronchitis often starts because of another illness, such as a cold or the flu  The illness spreads from your nose and throat to your windpipe and airways  Bronchitis is often called a chest cold  Acute bronchitis lasts about 3 to 6 weeks and is usually not a serious illness  Your cough can last for several weeks  DISCHARGE INSTRUCTIONS:   Return to the emergency department if:   · You cough up blood  · Your lips or fingernails turn blue  · You feel like you are not getting enough air when you breathe  Contact your healthcare provider if:   · You have a fever  · Your breathing problems do not go away or get worse  · Your cough does not get better within 4 weeks  · You have questions or concerns about your condition or care  Self-care:   · Get more rest   Rest helps your body to heal  Slowly start to do more each day  Rest when you feel it is needed  · Avoid irritants in the air  Avoid chemicals, fumes, and dust  Wear a face mask if you must work around dust or fumes  Stay inside on days when air pollution levels are high  If you have allergies, stay inside when pollen counts are high  Do not use aerosol products, such as spray-on deodorant, bug spray, and hair spray  · Do not smoke or be around others who smoke  Nicotine and other chemicals in cigarettes and cigars damages the cilia that move mucus out of your lungs  Ask your healthcare provider for information if you currently smoke and need help to quit  E-cigarettes or smokeless tobacco still contain nicotine  Talk to your healthcare provider before you use these products  · Drink liquids as directed  Liquids help keep your air passages moist and help you cough up mucus   You may need to drink more liquids when you have acute bronchitis  Ask how much liquid to drink each day and which liquids are best for you  · Use a humidifier or vaporizer  Use a cool mist humidifier or a vaporizer to increase air moisture in your home  This may make it easier for you to breathe and help decrease your cough  Decrease risk for acute bronchitis:   · Get the vaccinations you need  Ask your healthcare provider if you should get vaccinated against the flu or pneumonia  · Prevent the spread of germs  You can decrease your risk of acute bronchitis and other illnesses by doing the following:     [facility] your hands often with soap and water  Carry germ-killing hand lotion or gel with you  You can use the lotion or gel to clean your hands when soap and water are not available  ¨ Do not touch your eyes, nose, or mouth unless you have washed your hands first     ¨ Always cover your mouth when you cough to prevent the spread of germs  It is best to cough into a tissue or your shirt sleeve instead of into your hand  Ask those around you cover their mouths when they cough  ¨ Try to avoid people who have a cold or the flu  If you are sick, stay away from others as much as possible  Medicines: Your healthcare provider may  give you any of the following:  · Ibuprofen or acetaminophen  are medicines that help lower your fever  They are available without a doctor's order  Ask your healthcare provider which medicine is right for you  Ask how much to take and how often to take it  Follow directions  These medicines can cause stomach bleeding if not taken correctly  Ibuprofen can cause kidney damage  Do not take ibuprofen if you have kidney disease, an ulcer, or allergies to aspirin  Acetaminophen can cause liver damage  Do not take more than 4,000 milligrams in 24 hours  · Decongestants  help loosen mucus in your lungs and make it easier to cough up  This can help you breathe easier  · Cough suppressants  decrease your urge to cough   If your cough produces mucus, do not take a cough suppressant unless your healthcare provider tells you to  Your healthcare provider may suggest that you take a cough suppressant at night so you can rest     · Inhalers  may be given  Your healthcare provider may give you one or more inhalers to help you breathe easier and cough less  An inhaler gives your medicine to open your airways  Ask your healthcare provider to show you how to use your inhaler correctly  · Take your medicine as directed  Contact your healthcare provider if you think your medicine is not helping or if you have side effects  Tell him of her if you are allergic to any medicine  Keep a list of the medicines, vitamins, and herbs you take  Include the amounts, and when and why you take them  Bring the list or the pill bottles to follow-up visits  Carry your medicine list with you in case of an emergency  Follow up with your healthcare provider as directed:  Write down questions you have so you will remember to ask them during your follow-up visits  © 2017 [employer] Information is for End User's use only and may not be sold, redistributed or otherwise used for commercial purposes  All illustrations and images included in CareNotes® are the copyrighted property of [employer]  or Henrry Marshall  The above information is an  only  It is not intended as medical advice for individual conditions or treatments  Talk to your doctor, nurse or pharmacist before following any medical regimen to see if it is safe and effective for you

## 2018-06-26 ENCOUNTER — TELEPHONE (OUTPATIENT)
Dept: GASTROENTEROLOGY | Facility: CLINIC | Age: 28
End: 2018-06-26

## 2018-06-26 DIAGNOSIS — R53.83 FATIGUE, UNSPECIFIED TYPE: Primary | ICD-10-CM

## 2018-06-26 NOTE — TELEPHONE ENCOUNTER
Spoke with patient  H/o crohns     Patient states he has been feeling more fatigued over the last month  Dr Hsu stated in march patient is in remission from crohns  Current symptoms 1 episode of darrhea per weak, mild abdominal pain 1-2/10 RLQ cramping  Denies n/v, constipation, fever, chills, SOB  They are requesting BW to see what his fatigue could be coming from  Advised patient to follow-up with PCP  Any other suggestions?

## 2018-06-27 NOTE — TELEPHONE ENCOUNTER
Would recommend he have a CBC and TSH done but I agree he should see his PCP because they may have additional bloodwork to order

## 2018-06-28 ENCOUNTER — APPOINTMENT (OUTPATIENT)
Dept: LAB | Facility: CLINIC | Age: 28
End: 2018-06-28
Payer: COMMERCIAL

## 2018-06-28 DIAGNOSIS — R53.83 FATIGUE, UNSPECIFIED TYPE: ICD-10-CM

## 2018-06-28 LAB
BASOPHILS # BLD AUTO: 0.04 THOUSANDS/ΜL (ref 0–0.1)
BASOPHILS NFR BLD AUTO: 0 % (ref 0–1)
EOSINOPHIL # BLD AUTO: 0.44 THOUSAND/ΜL (ref 0–0.61)
EOSINOPHIL NFR BLD AUTO: 5 % (ref 0–6)
ERYTHROCYTE [DISTWIDTH] IN BLOOD BY AUTOMATED COUNT: 13.2 % (ref 11.6–15.1)
HCT VFR BLD AUTO: 42.9 % (ref 36.5–49.3)
HGB BLD-MCNC: 14.5 G/DL (ref 12–17)
IMM GRANULOCYTES # BLD AUTO: 0.01 THOUSAND/UL (ref 0–0.2)
IMM GRANULOCYTES NFR BLD AUTO: 0 % (ref 0–2)
LYMPHOCYTES # BLD AUTO: 3.53 THOUSANDS/ΜL (ref 0.6–4.47)
LYMPHOCYTES NFR BLD AUTO: 39 % (ref 14–44)
MCH RBC QN AUTO: 28.9 PG (ref 26.8–34.3)
MCHC RBC AUTO-ENTMCNC: 33.8 G/DL (ref 31.4–37.4)
MCV RBC AUTO: 86 FL (ref 82–98)
MONOCYTES # BLD AUTO: 0.82 THOUSAND/ΜL (ref 0.17–1.22)
MONOCYTES NFR BLD AUTO: 9 % (ref 4–12)
NEUTROPHILS # BLD AUTO: 4.21 THOUSANDS/ΜL (ref 1.85–7.62)
NEUTS SEG NFR BLD AUTO: 47 % (ref 43–75)
NRBC BLD AUTO-RTO: 0 /100 WBCS
PLATELET # BLD AUTO: 338 THOUSANDS/UL (ref 149–390)
PMV BLD AUTO: 9.7 FL (ref 8.9–12.7)
RBC # BLD AUTO: 5.02 MILLION/UL (ref 3.88–5.62)
TSH SERPL DL<=0.05 MIU/L-ACNC: 0.77 UIU/ML (ref 0.36–3.74)
WBC # BLD AUTO: 9.05 THOUSAND/UL (ref 4.31–10.16)

## 2018-06-28 PROCEDURE — 85025 COMPLETE CBC W/AUTO DIFF WBC: CPT

## 2018-06-28 PROCEDURE — 36415 COLL VENOUS BLD VENIPUNCTURE: CPT

## 2018-06-28 PROCEDURE — 84443 ASSAY THYROID STIM HORMONE: CPT

## 2018-06-29 ENCOUNTER — TELEPHONE (OUTPATIENT)
Dept: GASTROENTEROLOGY | Facility: CLINIC | Age: 28
End: 2018-06-29

## 2018-06-29 NOTE — TELEPHONE ENCOUNTER
----- Message from Diana Rubio PA-C sent at 6/29/2018  8:31 AM EDT -----  The patient's CBC and TSH are normal  Make sure he follows up with PCP due to his report of fatigue

## 2018-07-07 DIAGNOSIS — K21.9 GASTROESOPHAGEAL REFLUX DISEASE, ESOPHAGITIS PRESENCE NOT SPECIFIED: ICD-10-CM

## 2018-07-09 RX ORDER — PANTOPRAZOLE SODIUM 40 MG/1
TABLET, DELAYED RELEASE ORAL
Qty: 60 TABLET | Refills: 3 | Status: SHIPPED | OUTPATIENT
Start: 2018-07-09 | End: 2019-07-22 | Stop reason: SDUPTHER

## 2018-07-11 DIAGNOSIS — K21.9 GASTROESOPHAGEAL REFLUX DISEASE, ESOPHAGITIS PRESENCE NOT SPECIFIED: ICD-10-CM

## 2018-07-12 RX ORDER — PANTOPRAZOLE SODIUM 40 MG/1
TABLET, DELAYED RELEASE ORAL
Qty: 90 TABLET | Refills: 0 | Status: SHIPPED | OUTPATIENT
Start: 2018-07-12 | End: 2018-10-03

## 2018-10-03 ENCOUNTER — OFFICE VISIT (OUTPATIENT)
Dept: GASTROENTEROLOGY | Facility: CLINIC | Age: 28
End: 2018-10-03
Payer: COMMERCIAL

## 2018-10-03 VITALS
WEIGHT: 207.5 LBS | HEART RATE: 69 BPM | SYSTOLIC BLOOD PRESSURE: 120 MMHG | BODY MASS INDEX: 37.95 KG/M2 | DIASTOLIC BLOOD PRESSURE: 86 MMHG

## 2018-10-03 DIAGNOSIS — K50.018 CROHN'S DISEASE OF ILEUM, OTHER COMPLICATION (HCC): Primary | ICD-10-CM

## 2018-10-03 DIAGNOSIS — K21.9 GERD WITHOUT ESOPHAGITIS: ICD-10-CM

## 2018-10-03 PROCEDURE — 99214 OFFICE O/P EST MOD 30 MIN: CPT | Performed by: INTERNAL MEDICINE

## 2018-10-03 NOTE — LETTER
October 3, 2018     Jim Musa, [address]  [facility] [address]    Patient: Ochoa Sims   YOB: 1990   Date of Visit: 10/3/2018       Dear Dr Dionne Osorio:    Thank you for referring Chanda Gilliam to me for evaluation  Below are my notes for this consultation  If you have questions, please do not hesitate to call me  I look forward to following your patient along with you  Sincerely,        Dottie Acevedo MD        CC: No Recipients  Dottie Acevedo MD  10/3/2018  2:14 PM  Sign at close encounter  Follow-up Note - [facility]  Ochoa Sims 1990 33 y o  male     ASSESSMENT @ PLAN:   He is a 79-year-old male with small bowel Crohn's disease and gastroesophageal reflux disease who was in a symptomatic remission  He is doing very well on weekly adalimumab therapy  He only has occasional cramping    1 he will continue on weekly adalimumab therapy    2 he will continue on his Protonix 40 mg daily    3 he will come back in 6 months    Reason:   Abdominal pain and small bowel Crohn's    HPI:   He is a 79-year-old male with small bowel Crohn's who is here for follow-up  He is doing very well  He has been on weekly adalimumab and he has been on remission since March  He has occasional cramping  He thinks that the shot wears off over the weekend he does it every Monday  He has 1-2 days wake as gauge needle cramping  He has no melena hematochezia no nausea no vomiting  His Protonix is also working very well for his chronic reflux symptoms  He has no heartburn regurgitation dysphagia  He does have central adiposity  He thinks he has lost a little bit of weight recently  REVIEW OF SYSTEMS:     CONSTITUTIONAL: Denies any fever, chills, or rigors  Good appetite, and no recent weight loss  HEENT: No earache or tinnitus  Denies hearing loss or visual disturbances  CARDIOVASCULAR: No chest pain or palpitations     RESPIRATORY: Denies any cough, hemoptysis, shortness of breath or dyspnea on exertion  GASTROINTESTINAL: As noted in the History of Present Illness  GENITOURINARY: No problems with urination  Denies any hematuria or dysuria  NEUROLOGIC: No dizziness or vertigo, denies headaches  MUSCULOSKELETAL: Denies any muscle or joint pain  SKIN: Denies skin rashes or itching  ENDOCRINE: Denies excessive thirst  Denies intolerance to heat or cold  PSYCHOSOCIAL: Denies depression or anxiety  Denies any recent memory loss  Past Medical History:   Diagnosis Date    GERD (gastroesophageal reflux disease)     H/O angina pectoris     Hyperlipidemia     Sleep apnea       Past Surgical History:   Procedure Laterality Date    CARPAL TUNNEL RELEASE      CHOLECYSTECTOMY      HERNIA REPAIR      [place] ESOPHAGOGASTRODUODENOSCOPY TRANSORAL DIAGNOSTIC N/A 4/20/2017    Procedure: EGD AND COLONOSCOPY;  Surgeon: Gagan Tejeda MD;  Location: [facility]; Service: Gastroenterology    SHOULDER SURGERY Right      Social History     Social History    Marital status: Single     Spouse name: N/A    Number of children: N/A    Years of education: N/A     Occupational History    Not on file  Social History Main Topics    Smoking status: Never Smoker    Smokeless tobacco: Never Used    Alcohol use No    Drug use: No    Sexual activity: Not on file     Other Topics Concern    Not on file     Social History Narrative    No narrative on file     Family History   Problem Relation Age of Onset    Diabetes Mother      Aluminum; Bee venom; Ciprofloxacin; Clindamycin; Doxycycline; and Percocet [oxycodone-acetaminophen]  Current Outpatient Prescriptions   Medication Sig Dispense Refill    Adalimumab (HUMIRA PEN) 40 MG/0 8ML PNKT Inject under the skin      ezetimibe (ZETIA) 10 mg tablet Take 10 mg by mouth daily      pantoprazole (PROTONIX) 40 mg tablet TAKE 1 TABLET TWICE DAILY 30 MINUTES BEFORE BREAKFAST AND DINNER   60 tablet 3    albuterol (PROVENTIL HFA,VENTOLIN HFA) 90 mcg/act inhaler Inhale 2 puffs every 4 (four) hours as needed for shortness of breath (Patient not taking: Reported on 10/3/2018 ) 1 Inhaler 0    fexofenadine (ALLEGRA) 60 MG tablet Take by mouth      HYDROcodone-acetaminophen (XODOL) 7 5-300 MG per tablet TAKE 1 TABLET EVERY FOUR TO SIX HOURS AS NEEDED  0     No current facility-administered medications for this visit  Blood pressure 120/86, pulse 69, weight 94 1 kg (207 lb 8 oz)  PHYSICAL EXAM:     General Appearance:   Alert, cooperative, no distress, appears stated age    HEENT:   Normocephalic, atraumatic, anicteric      Neck:  Supple, symmetrical, trachea midline, no adenopathy;    thyroid: no enlargement/tenderness/nodules; no carotid  bruit or JVD    Lungs:   Clear to auscultation bilaterally; no rales, rhonchi or wheezing; respirations unlabored    Heart[de-identified]   S1 and S2 normal; regular rate and rhythm; no murmur, rub, or gallop     Abdomen:   Soft, non-tender, non-distended; normal bowel sounds; no masses, no organomegaly    Genitalia:   Deferred    Rectal:   Deferred    Extremities:  No cyanosis, clubbing or edema    Pulses:  2+ and symmetric all extremities    Skin:  Skin color, texture, turgor normal, no rashes or lesions    Lymph nodes:  No palpable cervical, axillary or inguinal lymphadenopathy        Lab Results   Component Value Date    WBC 9 05 06/28/2018    HGB 14 5 06/28/2018    HCT 42 9 06/28/2018    MCV 86 06/28/2018     06/28/2018     Lab Results   Component Value Date    CALCIUM 9 2 03/29/2018     03/29/2018    K 4 1 03/29/2018    CO2 26 03/29/2018    CL 98 (L) 03/29/2018    BUN 14 03/29/2018    CREATININE 0 87 03/29/2018     Lab Results   Component Value Date     (H) 03/29/2018    AST 73 (H) 03/29/2018    ALKPHOS 54 03/29/2018     No results found for: INR, PROTIME

## 2018-10-03 NOTE — PROGRESS NOTES
Follow-up Note -  [facility]  Demi Ochoa 1990 33 y o  male     ASSESSMENT @ PLAN:   He is a 77-year-old male with small bowel Crohn's disease and gastroesophageal reflux disease who was in a symptomatic remission  He is doing very well on weekly adalimumab therapy  He only has occasional cramping    1 he will continue on weekly adalimumab therapy    2 he will continue on his Protonix 40 mg daily    3 he will come back in 6 months    Reason:   Abdominal pain and small bowel Crohn's    HPI:   He is a 77-year-old male with small bowel Crohn's who is here for follow-up  He is doing very well  He has been on weekly adalimumab and he has been on remission since March  He has occasional cramping  He thinks that the shot wears off over the weekend he does it every Monday  He has 1-2 days wake as gauge needle cramping  He has no melena hematochezia no nausea no vomiting  His Protonix is also working very well for his chronic reflux symptoms  He has no heartburn regurgitation dysphagia  He does have central adiposity  He thinks he has lost a little bit of weight recently  REVIEW OF SYSTEMS:     CONSTITUTIONAL: Denies any fever, chills, or rigors  Good appetite, and no recent weight loss  HEENT: No earache or tinnitus  Denies hearing loss or visual disturbances  CARDIOVASCULAR: No chest pain or palpitations  RESPIRATORY: Denies any cough, hemoptysis, shortness of breath or dyspnea on exertion  GASTROINTESTINAL: As noted in the History of Present Illness  GENITOURINARY: No problems with urination  Denies any hematuria or dysuria  NEUROLOGIC: No dizziness or vertigo, denies headaches  MUSCULOSKELETAL: Denies any muscle or joint pain  SKIN: Denies skin rashes or itching  ENDOCRINE: Denies excessive thirst  Denies intolerance to heat or cold  PSYCHOSOCIAL: Denies depression or anxiety  Denies any recent memory loss       Past Medical History:   Diagnosis Date    GERD (gastroesophageal reflux disease)     H/O angina pectoris     Hyperlipidemia     Sleep apnea       Past Surgical History:   Procedure Laterality Date    CARPAL TUNNEL RELEASE      CHOLECYSTECTOMY      HERNIA REPAIR      [place] ESOPHAGOGASTRODUODENOSCOPY TRANSORAL DIAGNOSTIC N/A 4/20/2017    Procedure: EGD AND COLONOSCOPY;  Surgeon: Marquis Santillan MD;  Location: [facility]; Service: Gastroenterology    SHOULDER SURGERY Right      Social History     Social History    Marital status: Single     Spouse name: N/A    Number of children: N/A    Years of education: N/A     Occupational History    Not on file  Social History Main Topics    Smoking status: Never Smoker    Smokeless tobacco: Never Used    Alcohol use No    Drug use: No    Sexual activity: Not on file     Other Topics Concern    Not on file     Social History Narrative    No narrative on file     Family History   Problem Relation Age of Onset    Diabetes Mother      Aluminum; Bee venom; Ciprofloxacin; Clindamycin; Doxycycline; and Percocet [oxycodone-acetaminophen]  Current Outpatient Prescriptions   Medication Sig Dispense Refill    Adalimumab (HUMIRA PEN) 40 MG/0 8ML PNKT Inject under the skin      ezetimibe (ZETIA) 10 mg tablet Take 10 mg by mouth daily      pantoprazole (PROTONIX) 40 mg tablet TAKE 1 TABLET TWICE DAILY 30 MINUTES BEFORE BREAKFAST AND DINNER  60 tablet 3    albuterol (PROVENTIL HFA,VENTOLIN HFA) 90 mcg/act inhaler Inhale 2 puffs every 4 (four) hours as needed for shortness of breath (Patient not taking: Reported on 10/3/2018 ) 1 Inhaler 0    fexofenadine (ALLEGRA) 60 MG tablet Take by mouth      HYDROcodone-acetaminophen (XODOL) 7 5-300 MG per tablet TAKE 1 TABLET EVERY FOUR TO SIX HOURS AS NEEDED  0     No current facility-administered medications for this visit  Blood pressure 120/86, pulse 69, weight 94 1 kg (207 lb 8 oz)      PHYSICAL EXAM:     General Appearance:   Alert, cooperative, no distress, appears stated age    HEENT:   Normocephalic, atraumatic, anicteric      Neck:  Supple, symmetrical, trachea midline, no adenopathy;    thyroid: no enlargement/tenderness/nodules; no carotid  bruit or JVD    Lungs:   Clear to auscultation bilaterally; no rales, rhonchi or wheezing; respirations unlabored    Heart[de-identified]   S1 and S2 normal; regular rate and rhythm; no murmur, rub, or gallop     Abdomen:   Soft, non-tender, non-distended; normal bowel sounds; no masses, no organomegaly    Genitalia:   Deferred    Rectal:   Deferred    Extremities:  No cyanosis, clubbing or edema    Pulses:  2+ and symmetric all extremities    Skin:  Skin color, texture, turgor normal, no rashes or lesions    Lymph nodes:  No palpable cervical, axillary or inguinal lymphadenopathy        Lab Results   Component Value Date    WBC 9 05 06/28/2018    HGB 14 5 06/28/2018    HCT 42 9 06/28/2018    MCV 86 06/28/2018     06/28/2018     Lab Results   Component Value Date    CALCIUM 9 2 03/29/2018     03/29/2018    K 4 1 03/29/2018    CO2 26 03/29/2018    CL 98 (L) 03/29/2018    BUN 14 03/29/2018    CREATININE 0 87 03/29/2018     Lab Results   Component Value Date     (H) 03/29/2018    AST 73 (H) 03/29/2018    ALKPHOS 54 03/29/2018     No results found for: INR, PROTIME

## 2018-12-04 DIAGNOSIS — K50.00 CROHN'S DISEASE INVOLVING TERMINAL ILEUM (HCC): Primary | ICD-10-CM

## 2018-12-04 RX ORDER — ADALIMUMAB 40MG/0.8ML
KIT SUBCUTANEOUS
Qty: 2 EACH | Refills: 10 | Status: SHIPPED | OUTPATIENT
Start: 2018-12-04 | End: 2019-06-25 | Stop reason: SDUPTHER

## 2018-12-06 ENCOUNTER — TELEPHONE (OUTPATIENT)
Dept: GASTROENTEROLOGY | Facility: CLINIC | Age: 28
End: 2018-12-06

## 2018-12-07 ENCOUNTER — TELEPHONE (OUTPATIENT)
Dept: GASTROENTEROLOGY | Facility: CLINIC | Age: 28
End: 2018-12-07

## 2018-12-07 DIAGNOSIS — K50.00 CROHN'S DISEASE INVOLVING TERMINAL ILEUM (HCC): ICD-10-CM

## 2018-12-07 NOTE — TELEPHONE ENCOUNTER
Called and spoke to Breezy she stated as of January 1st [facility] is no longer Contracted via [facility] and will now be using [facility]      They only do the Fax prescriptions  And will not be operating electronic scripts at this time     Their phone number: [phone] fax number:      [phone]

## 2018-12-10 NOTE — TELEPHONE ENCOUNTER
Spoke to rep and faxing script to [facility]       Phone Number: [phone]  Fax Number: [phone]

## 2019-02-28 ENCOUNTER — TELEPHONE (OUTPATIENT)
Dept: GASTROENTEROLOGY | Facility: CLINIC | Age: 29
End: 2019-02-28

## 2019-02-28 NOTE — TELEPHONE ENCOUNTER
Dr Edu Fernandez - Patient's mother called  Patient is schedule for Humira injection on Monday, [facility] needs authorization before it can fill prescription    Please call [address] at [phone]

## 2019-02-28 NOTE — TELEPHONE ENCOUNTER
Spoke to Kinza Kam from [facility], stated pt needs a new auth for his Humira  Submitted PA for Humira, Called pt mom Fredy Long and advised [address] was submitted, will call her once it is approved

## 2019-03-06 NOTE — TELEPHONE ENCOUNTER
Approved    Expires: 2/28/2020  Auth#: [id number]  Rep Name: Kasia Lazaro with [address] [place] [address]

## 2019-05-15 RX ORDER — ATORVASTATIN CALCIUM 20 MG/1
TABLET, FILM COATED ORAL
COMMUNITY
End: 2022-01-13

## 2019-05-17 ENCOUNTER — OFFICE VISIT (OUTPATIENT)
Dept: GASTROENTEROLOGY | Facility: CLINIC | Age: 29
End: 2019-05-17
Payer: COMMERCIAL

## 2019-05-17 VITALS
SYSTOLIC BLOOD PRESSURE: 126 MMHG | DIASTOLIC BLOOD PRESSURE: 86 MMHG | BODY MASS INDEX: 39.71 KG/M2 | WEIGHT: 217.13 LBS | HEART RATE: 83 BPM

## 2019-05-17 DIAGNOSIS — K50.018 CROHN'S DISEASE OF ILEUM, OTHER COMPLICATION (HCC): Primary | ICD-10-CM

## 2019-05-17 DIAGNOSIS — K76.0 FATTY LIVER: ICD-10-CM

## 2019-05-17 DIAGNOSIS — K21.9 GERD WITHOUT ESOPHAGITIS: ICD-10-CM

## 2019-05-17 PROBLEM — E66.813 CLASS 3 SEVERE OBESITY IN ADULT (HCC): Status: ACTIVE | Noted: 2019-05-17

## 2019-05-17 PROBLEM — E66.01 CLASS 3 SEVERE OBESITY IN ADULT (HCC): Status: ACTIVE | Noted: 2019-05-17

## 2019-05-17 PROCEDURE — 99214 OFFICE O/P EST MOD 30 MIN: CPT | Performed by: INTERNAL MEDICINE

## 2019-06-25 DIAGNOSIS — K50.00 CROHN'S DISEASE INVOLVING TERMINAL ILEUM (HCC): ICD-10-CM

## 2019-06-25 RX ORDER — ADALIMUMAB 40MG/0.8ML
KIT SUBCUTANEOUS
Qty: 4 EACH | Refills: 5 | Status: SHIPPED | OUTPATIENT
Start: 2019-06-25 | End: 2021-03-09

## 2019-07-22 DIAGNOSIS — K21.9 GASTROESOPHAGEAL REFLUX DISEASE, ESOPHAGITIS PRESENCE NOT SPECIFIED: ICD-10-CM

## 2019-07-22 RX ORDER — PANTOPRAZOLE SODIUM 40 MG/1
TABLET, DELAYED RELEASE ORAL
Qty: 60 TABLET | Refills: 3 | Status: SHIPPED | OUTPATIENT
Start: 2019-07-22 | End: 2019-12-05 | Stop reason: SDUPTHER

## 2019-12-05 DIAGNOSIS — K21.9 GASTROESOPHAGEAL REFLUX DISEASE, ESOPHAGITIS PRESENCE NOT SPECIFIED: ICD-10-CM

## 2019-12-05 RX ORDER — PANTOPRAZOLE SODIUM 40 MG/1
TABLET, DELAYED RELEASE ORAL
Qty: 60 TABLET | Refills: 3 | Status: SHIPPED | OUTPATIENT
Start: 2019-12-05 | End: 2019-12-10

## 2019-12-10 ENCOUNTER — OFFICE VISIT (OUTPATIENT)
Dept: GASTROENTEROLOGY | Facility: CLINIC | Age: 29
End: 2019-12-10
Payer: COMMERCIAL

## 2019-12-10 ENCOUNTER — TELEPHONE (OUTPATIENT)
Dept: GASTROENTEROLOGY | Facility: CLINIC | Age: 29
End: 2019-12-10

## 2019-12-10 VITALS
DIASTOLIC BLOOD PRESSURE: 86 MMHG | HEART RATE: 86 BPM | BODY MASS INDEX: 38.07 KG/M2 | SYSTOLIC BLOOD PRESSURE: 122 MMHG | WEIGHT: 208.13 LBS

## 2019-12-10 DIAGNOSIS — K21.9 GASTROESOPHAGEAL REFLUX DISEASE, ESOPHAGITIS PRESENCE NOT SPECIFIED: ICD-10-CM

## 2019-12-10 DIAGNOSIS — K76.0 FATTY LIVER: ICD-10-CM

## 2019-12-10 DIAGNOSIS — K50.018 CROHN'S DISEASE OF ILEUM, OTHER COMPLICATION (HCC): Primary | ICD-10-CM

## 2019-12-10 DIAGNOSIS — K21.9 GERD WITHOUT ESOPHAGITIS: ICD-10-CM

## 2019-12-10 PROCEDURE — 99214 OFFICE O/P EST MOD 30 MIN: CPT | Performed by: INTERNAL MEDICINE

## 2019-12-10 RX ORDER — PANTOPRAZOLE SODIUM 40 MG/1
40 TABLET, DELAYED RELEASE ORAL DAILY
Qty: 60 TABLET | Refills: 3 | Status: SHIPPED | OUTPATIENT
Start: 2019-12-10 | End: 2020-06-12

## 2019-12-10 NOTE — PROGRESS NOTES
Follow-up Note -  [facility]  Antoine Fernandez 1990 26 y o  male     ASSESSMENT @ PLAN:   He is a 62-year-old male with gastroesophageal reflux disease related to his obesity, fatty liver disease, and small bowel Crohn's who is basically at baseline  He has lost 10 pounds since his last visit  1 he has gotten down to Protonix 40 milligrams daily and he will continue on this dose    2 he is on weekly adalimumab    3 will send for annual blood work    4 I told him I want him to lose another 10 pounds over the next 6 months    Reason:   GERD and fatty liver and small bowel Crohn's    HPI:   He is a 62-year-old male with GERD and fatty liver and small bowel Crohn's who is here for follow-up  He reports his reflux is under control  He has no regurgitation or heartburn or dysphagia  He is down to Protonix 40 milligrams daily and seems to be doing well on once daily dose  He was on twice daily dose before  He is doing well on the weekly adalimumab shots  He does seem to have a day or 2 before the shot where he has increased cramping and increased stool frequency  He denies fevers chills malaise fatigue or joint symptoms  He does feel like he is doing well  He has lost 10 pounds  I told him this is a great help for his liver and for his reflux  He has no melena hematochezia    REVIEW OF SYSTEMS:     CONSTITUTIONAL: Denies any fever, chills, or rigors  Good appetite, and no recent weight loss  HEENT: No earache or tinnitus  Denies hearing loss or visual disturbances  CARDIOVASCULAR: No chest pain or palpitations  RESPIRATORY: Denies any cough, hemoptysis, shortness of breath or dyspnea on exertion  GASTROINTESTINAL: As noted in the History of Present Illness  GENITOURINARY: No problems with urination  Denies any hematuria or dysuria  NEUROLOGIC: No dizziness or vertigo, denies headaches  MUSCULOSKELETAL: Denies any muscle or joint pain  SKIN: Denies skin rashes or itching  ENDOCRINE: Denies excessive thirst  Denies intolerance to heat or cold  PSYCHOSOCIAL: Denies depression or anxiety  Denies any recent memory loss  Past Medical History:   Diagnosis Date    GERD (gastroesophageal reflux disease)     H/O angina pectoris     Hyperlipidemia     Sleep apnea       Past Surgical History:   Procedure Laterality Date    CARPAL TUNNEL RELEASE      CHOLECYSTECTOMY      HERNIA REPAIR      [place] ESOPHAGOGASTRODUODENOSCOPY TRANSORAL DIAGNOSTIC N/A 4/20/2017    Procedure: EGD AND COLONOSCOPY;  Surgeon: Juliette Pena MD;  Location: [facility];   Service: Gastroenterology    SHOULDER SURGERY Right      Social History     Socioeconomic History    Marital status: Single     Spouse name: Not on file    Number of children: Not on file    Years of education: Not on file    Highest education level: Not on file   Occupational History    Not on file   Social Needs    Financial resource strain: Not on file    Food insecurity:     Worry: Not on file     Inability: Not on file    Transportation needs:     Medical: Not on file     Non-medical: Not on file   Tobacco Use    Smoking status: Never Smoker    Smokeless tobacco: Never Used   Substance and Sexual Activity    Alcohol use: No    Drug use: No    Sexual activity: Not on file   Lifestyle    Physical activity:     Days per week: Not on file     Minutes per session: Not on file    Stress: Not on file   Relationships    Social connections:     Talks on phone: Not on file     Gets together: Not on file     Attends Advent service: Not on file     Active member of club or organization: Not on file     Attends meetings of clubs or organizations: Not on file     Relationship status: Not on file    Intimate partner violence:     Fear of current or ex partner: Not on file     Emotionally abused: Not on file     Physically abused: Not on file     Forced sexual activity: Not on file   Other Topics Concern    Not on file   Social History Narrative    Not on file     Family History   Problem Relation Age of Onset    Diabetes Mother      Aluminum; Bee venom; Ciprofloxacin; Clindamycin; Doxycycline; and Percocet [oxycodone-acetaminophen]  Current Outpatient Medications   Medication Sig Dispense Refill    albuterol (PROVENTIL HFA,VENTOLIN HFA) 90 mcg/act inhaler Inhale 2 puffs every 4 (four) hours as needed for shortness of breath 1 Inhaler 0    atorvastatin (LIPITOR) 20 mg tablet atorvastatin 20 mg tablet      ezetimibe (ZETIA) 10 mg tablet Take 10 mg by mouth daily      fexofenadine (ALLEGRA) 60 MG tablet Take by mouth      HUMIRA PEN 40 MG/0 8ML PNKT INJECT 40 MG UNDER THE SKIN EVERY WEEK 4 each 5    HYDROcodone-acetaminophen (XODOL) 7 5-300 MG per tablet TAKE 1 TABLET EVERY FOUR TO SIX HOURS AS NEEDED  0    pantoprazole (PROTONIX) 40 mg tablet Take 1 tablet (40 mg total) by mouth daily Before breakfast and dinner 60 tablet 3     No current facility-administered medications for this visit  Blood pressure 122/86, pulse 86, weight 94 4 kg (208 lb 2 oz)  PHYSICAL EXAM:     General Appearance:   Alert, cooperative, no distress, appears stated age    HEENT:   Normocephalic, atraumatic, anicteric      Neck:  Supple, symmetrical, trachea midline, no adenopathy;    thyroid: no enlargement/tenderness/nodules; no carotid  bruit or JVD    Lungs:   Clear to auscultation bilaterally; no rales, rhonchi or wheezing; respirations unlabored    Heart[de-identified]   S1 and S2 normal; regular rate and rhythm; no murmur, rub, or gallop     Abdomen:   Soft, non-tender, non-distended; normal bowel sounds; no masses, no organomegaly    Genitalia:   Deferred    Rectal:   Deferred    Extremities:  No cyanosis, clubbing or edema    Pulses:  2+ and symmetric all extremities    Skin:  Skin color, texture, turgor normal, no rashes or lesions    Lymph nodes:  No palpable cervical, axillary or inguinal lymphadenopathy        Lab Results   Component Value Date    WBC 9 05 06/28/2018    HGB 14 5 06/28/2018    HCT 42 9 06/28/2018    MCV 86 06/28/2018     06/28/2018     Lab Results   Component Value Date    CALCIUM 9 2 03/29/2018    K 4 1 03/29/2018    CO2 26 03/29/2018    CL 98 (L) 03/29/2018    BUN 14 03/29/2018    CREATININE 0 87 03/29/2018     Lab Results   Component Value Date     (H) 03/29/2018    AST 73 (H) 03/29/2018    ALKPHOS 54 03/29/2018     No results found for: INR, PROTIME

## 2019-12-11 ENCOUNTER — APPOINTMENT (OUTPATIENT)
Dept: LAB | Facility: HOSPITAL | Age: 29
End: 2019-12-11
Attending: INTERNAL MEDICINE
Payer: COMMERCIAL

## 2019-12-11 ENCOUNTER — TELEPHONE (OUTPATIENT)
Dept: GASTROENTEROLOGY | Facility: CLINIC | Age: 29
End: 2019-12-11

## 2019-12-11 DIAGNOSIS — K50.018 CROHN'S DISEASE OF ILEUM, OTHER COMPLICATION (HCC): ICD-10-CM

## 2019-12-11 LAB
ALBUMIN SERPL BCP-MCNC: 3.9 G/DL (ref 3.5–5)
ALP SERPL-CCNC: 61 U/L (ref 46–116)
ALT SERPL W P-5'-P-CCNC: 91 U/L (ref 12–78)
ANION GAP SERPL CALCULATED.3IONS-SCNC: 10 MMOL/L (ref 4–13)
AST SERPL W P-5'-P-CCNC: 37 U/L (ref 5–45)
BASOPHILS # BLD AUTO: 0.05 THOUSANDS/ΜL (ref 0–0.1)
BASOPHILS NFR BLD AUTO: 1 % (ref 0–1)
BILIRUB SERPL-MCNC: 0.5 MG/DL (ref 0.2–1)
BUN SERPL-MCNC: 22 MG/DL (ref 5–25)
CALCIUM SERPL-MCNC: 8.9 MG/DL (ref 8.3–10.1)
CHLORIDE SERPL-SCNC: 101 MMOL/L (ref 100–108)
CHOLEST SERPL-MCNC: 195 MG/DL (ref 50–200)
CO2 SERPL-SCNC: 29 MMOL/L (ref 21–32)
CREAT SERPL-MCNC: 0.97 MG/DL (ref 0.6–1.3)
EOSINOPHIL # BLD AUTO: 0.31 THOUSAND/ΜL (ref 0–0.61)
EOSINOPHIL NFR BLD AUTO: 3 % (ref 0–6)
ERYTHROCYTE [DISTWIDTH] IN BLOOD BY AUTOMATED COUNT: 13 % (ref 11.6–15.1)
GFR SERPL CREATININE-BSD FRML MDRD: 104 ML/MIN/1.73SQ M
GLUCOSE P FAST SERPL-MCNC: 77 MG/DL (ref 65–99)
HCT VFR BLD AUTO: 47.9 % (ref 36.5–49.3)
HDLC SERPL-MCNC: 36 MG/DL
HGB BLD-MCNC: 15.8 G/DL (ref 12–17)
IMM GRANULOCYTES # BLD AUTO: 0.03 THOUSAND/UL (ref 0–0.2)
IMM GRANULOCYTES NFR BLD AUTO: 0 % (ref 0–2)
LDLC SERPL CALC-MCNC: 116 MG/DL (ref 0–100)
LYMPHOCYTES # BLD AUTO: 3.45 THOUSANDS/ΜL (ref 0.6–4.47)
LYMPHOCYTES NFR BLD AUTO: 38 % (ref 14–44)
MCH RBC QN AUTO: 29.1 PG (ref 26.8–34.3)
MCHC RBC AUTO-ENTMCNC: 33 G/DL (ref 31.4–37.4)
MCV RBC AUTO: 88 FL (ref 82–98)
MONOCYTES # BLD AUTO: 0.63 THOUSAND/ΜL (ref 0.17–1.22)
MONOCYTES NFR BLD AUTO: 7 % (ref 4–12)
NEUTROPHILS # BLD AUTO: 4.61 THOUSANDS/ΜL (ref 1.85–7.62)
NEUTS SEG NFR BLD AUTO: 51 % (ref 43–75)
NONHDLC SERPL-MCNC: 159 MG/DL
NRBC BLD AUTO-RTO: 0 /100 WBCS
PLATELET # BLD AUTO: 368 THOUSANDS/UL (ref 149–390)
PMV BLD AUTO: 9.7 FL (ref 8.9–12.7)
POTASSIUM SERPL-SCNC: 4.6 MMOL/L (ref 3.5–5.3)
PROT SERPL-MCNC: 7.7 G/DL (ref 6.4–8.2)
RBC # BLD AUTO: 5.43 MILLION/UL (ref 3.88–5.62)
SODIUM SERPL-SCNC: 140 MMOL/L (ref 136–145)
TRIGL SERPL-MCNC: 217 MG/DL
WBC # BLD AUTO: 9.08 THOUSAND/UL (ref 4.31–10.16)

## 2019-12-11 PROCEDURE — 80053 COMPREHEN METABOLIC PANEL: CPT

## 2019-12-11 PROCEDURE — 85025 COMPLETE CBC W/AUTO DIFF WBC: CPT

## 2019-12-11 PROCEDURE — 36415 COLL VENOUS BLD VENIPUNCTURE: CPT

## 2019-12-11 PROCEDURE — 80061 LIPID PANEL: CPT

## 2019-12-11 NOTE — TELEPHONE ENCOUNTER
Spoke with patient advised patient per Dr Higinio Sims previous note his recommendations and results of blood work  Patient voiced understanding and did not have any further questions at this time

## 2019-12-11 NOTE — TELEPHONE ENCOUNTER
----- Message from Anitra Otero MD sent at 12/11/2019  9:41 AM EST -----  Tell him that his liver enzymes are improved with his 10 lb weight loss; however they are still elevated and his triglyceride level is still elevated; tell him I want him to lose another 10 lb

## 2020-04-01 ENCOUNTER — TELEPHONE (OUTPATIENT)
Dept: GASTROENTEROLOGY | Facility: CLINIC | Age: 30
End: 2020-04-01

## 2020-04-09 ENCOUNTER — TELEPHONE (OUTPATIENT)
Dept: GASTROENTEROLOGY | Facility: CLINIC | Age: 30
End: 2020-04-09

## 2020-05-21 ENCOUNTER — TELEPHONE (OUTPATIENT)
Dept: GASTROENTEROLOGY | Facility: CLINIC | Age: 30
End: 2020-05-21

## 2020-06-12 ENCOUNTER — OFFICE VISIT (OUTPATIENT)
Dept: GASTROENTEROLOGY | Facility: CLINIC | Age: 30
End: 2020-06-12
Payer: COMMERCIAL

## 2020-06-12 VITALS
HEIGHT: 63 IN | DIASTOLIC BLOOD PRESSURE: 78 MMHG | WEIGHT: 208.8 LBS | SYSTOLIC BLOOD PRESSURE: 126 MMHG | BODY MASS INDEX: 37 KG/M2 | HEART RATE: 74 BPM | TEMPERATURE: 98 F

## 2020-06-12 DIAGNOSIS — K21.9 GERD WITHOUT ESOPHAGITIS: ICD-10-CM

## 2020-06-12 DIAGNOSIS — K76.0 FATTY LIVER: ICD-10-CM

## 2020-06-12 DIAGNOSIS — K50.018 CROHN'S DISEASE OF ILEUM, OTHER COMPLICATION (HCC): Primary | ICD-10-CM

## 2020-06-12 DIAGNOSIS — K21.9 GASTROESOPHAGEAL REFLUX DISEASE, ESOPHAGITIS PRESENCE NOT SPECIFIED: ICD-10-CM

## 2020-06-12 PROCEDURE — 99214 OFFICE O/P EST MOD 30 MIN: CPT | Performed by: INTERNAL MEDICINE

## 2020-06-12 RX ORDER — PANTOPRAZOLE SODIUM 40 MG/1
40 TABLET, DELAYED RELEASE ORAL 2 TIMES DAILY
Qty: 60 TABLET | Refills: 1 | Status: SHIPPED | OUTPATIENT
Start: 2020-06-12 | End: 2020-08-13

## 2020-08-12 DIAGNOSIS — K21.9 GASTROESOPHAGEAL REFLUX DISEASE, ESOPHAGITIS PRESENCE NOT SPECIFIED: ICD-10-CM

## 2020-08-13 RX ORDER — PANTOPRAZOLE SODIUM 40 MG/1
40 TABLET, DELAYED RELEASE ORAL 2 TIMES DAILY
Qty: 60 TABLET | Refills: 1 | Status: SHIPPED | OUTPATIENT
Start: 2020-08-13 | End: 2020-11-20

## 2020-11-20 ENCOUNTER — LAB (OUTPATIENT)
Dept: LAB | Facility: HOSPITAL | Age: 30
End: 2020-11-20
Attending: INTERNAL MEDICINE
Payer: COMMERCIAL

## 2020-11-20 ENCOUNTER — OFFICE VISIT (OUTPATIENT)
Dept: GASTROENTEROLOGY | Facility: CLINIC | Age: 30
End: 2020-11-20
Payer: COMMERCIAL

## 2020-11-20 VITALS
BODY MASS INDEX: 37.78 KG/M2 | WEIGHT: 213.25 LBS | HEART RATE: 87 BPM | SYSTOLIC BLOOD PRESSURE: 118 MMHG | DIASTOLIC BLOOD PRESSURE: 84 MMHG

## 2020-11-20 DIAGNOSIS — K76.0 FATTY LIVER: ICD-10-CM

## 2020-11-20 DIAGNOSIS — K21.9 GERD WITHOUT ESOPHAGITIS: ICD-10-CM

## 2020-11-20 DIAGNOSIS — K50.018 CROHN'S DISEASE OF ILEUM, OTHER COMPLICATION (HCC): Primary | ICD-10-CM

## 2020-11-20 DIAGNOSIS — K21.9 GASTROESOPHAGEAL REFLUX DISEASE: ICD-10-CM

## 2020-11-20 LAB
ALBUMIN SERPL BCP-MCNC: 3.9 G/DL (ref 3.5–5)
ALP SERPL-CCNC: 61 U/L (ref 46–116)
ALT SERPL W P-5'-P-CCNC: 111 U/L (ref 12–78)
ANION GAP SERPL CALCULATED.3IONS-SCNC: 9 MMOL/L (ref 4–13)
AST SERPL W P-5'-P-CCNC: 44 U/L (ref 5–45)
BILIRUB SERPL-MCNC: 0.4 MG/DL (ref 0.2–1)
BUN SERPL-MCNC: 17 MG/DL (ref 5–25)
CALCIUM SERPL-MCNC: 9 MG/DL (ref 8.3–10.1)
CHLORIDE SERPL-SCNC: 103 MMOL/L (ref 100–108)
CO2 SERPL-SCNC: 30 MMOL/L (ref 21–32)
CREAT SERPL-MCNC: 0.98 MG/DL (ref 0.6–1.3)
FERRITIN SERPL-MCNC: 378 NG/ML (ref 8–388)
GFR SERPL CREATININE-BSD FRML MDRD: 102 ML/MIN/1.73SQ M
GLUCOSE SERPL-MCNC: 97 MG/DL (ref 65–140)
IRON SATN MFR SERPL: 46 %
IRON SERPL-MCNC: 133 UG/DL (ref 65–175)
POTASSIUM SERPL-SCNC: 3.9 MMOL/L (ref 3.5–5.3)
PROT SERPL-MCNC: 8 G/DL (ref 6.4–8.2)
SODIUM SERPL-SCNC: 142 MMOL/L (ref 136–145)
TIBC SERPL-MCNC: 287 UG/DL (ref 250–450)

## 2020-11-20 PROCEDURE — 87340 HEPATITIS B SURFACE AG IA: CPT

## 2020-11-20 PROCEDURE — 80053 COMPREHEN METABOLIC PANEL: CPT

## 2020-11-20 PROCEDURE — 82728 ASSAY OF FERRITIN: CPT

## 2020-11-20 PROCEDURE — 86803 HEPATITIS C AB TEST: CPT

## 2020-11-20 PROCEDURE — 99214 OFFICE O/P EST MOD 30 MIN: CPT | Performed by: INTERNAL MEDICINE

## 2020-11-20 PROCEDURE — 86256 FLUORESCENT ANTIBODY TITER: CPT

## 2020-11-20 PROCEDURE — 83540 ASSAY OF IRON: CPT

## 2020-11-20 PROCEDURE — 86038 ANTINUCLEAR ANTIBODIES: CPT

## 2020-11-20 PROCEDURE — 36415 COLL VENOUS BLD VENIPUNCTURE: CPT

## 2020-11-20 PROCEDURE — 83550 IRON BINDING TEST: CPT

## 2020-11-20 PROCEDURE — 86704 HEP B CORE ANTIBODY TOTAL: CPT

## 2020-11-20 PROCEDURE — 86705 HEP B CORE ANTIBODY IGM: CPT

## 2020-11-20 PROCEDURE — 86235 NUCLEAR ANTIGEN ANTIBODY: CPT

## 2020-11-20 RX ORDER — PANTOPRAZOLE SODIUM 40 MG/1
40 TABLET, DELAYED RELEASE ORAL DAILY
Qty: 60 TABLET | Refills: 1 | Status: SHIPPED | OUTPATIENT
Start: 2020-11-20

## 2020-11-21 LAB
ACTIN IGG SERPL-ACNC: 3 UNITS (ref 0–19)
HBV CORE AB SER QL: NORMAL
HBV CORE IGM SER QL: NORMAL
HBV SURFACE AG SER QL: NORMAL
HCV AB SER QL: NORMAL
MITOCHONDRIA M2 IGG SER-ACNC: <20 UNITS (ref 0–20)

## 2020-11-23 LAB — RYE IGE QN: NEGATIVE

## 2021-03-09 DIAGNOSIS — K50.00 CROHN'S DISEASE INVOLVING TERMINAL ILEUM (HCC): ICD-10-CM

## 2021-03-09 RX ORDER — ADALIMUMAB 40MG/0.8ML
KIT SUBCUTANEOUS
Qty: 4 EACH | Refills: 8 | Status: SHIPPED | OUTPATIENT
Start: 2021-03-09 | End: 2021-10-15 | Stop reason: SDUPTHER

## 2021-05-03 ENCOUNTER — TELEPHONE (OUTPATIENT)
Dept: GASTROENTEROLOGY | Facility: CLINIC | Age: 31
End: 2021-05-03

## 2021-05-03 NOTE — TELEPHONE ENCOUNTER
[phone] to request a PA for continuing therapy for Humira 40 mg weekly  Will wait for reply [id number]

## 2021-05-06 ENCOUNTER — TELEPHONE (OUTPATIENT)
Dept: GASTROENTEROLOGY | Facility: CLINIC | Age: 31
End: 2021-05-06

## 2021-05-06 DIAGNOSIS — K50.018 CROHN'S DISEASE OF ILEUM, OTHER COMPLICATION (HCC): Primary | ICD-10-CM

## 2021-05-06 NOTE — TELEPHONE ENCOUNTER
Received denial from [employer] for patient's Humira  They are requiring patient to be tested for TB in the last 12 months  Also due to dosing frequency  Will appeal this decision and send patient for Quant[employer] testing  Left a voicemail for patient to call the office so he can be made aware of the above

## 2021-05-13 ENCOUNTER — APPOINTMENT (OUTPATIENT)
Dept: LAB | Facility: CLINIC | Age: 31
End: 2021-05-13
Payer: COMMERCIAL

## 2021-05-13 ENCOUNTER — TELEPHONE (OUTPATIENT)
Dept: GASTROENTEROLOGY | Facility: CLINIC | Age: 31
End: 2021-05-13

## 2021-05-13 ENCOUNTER — OFFICE VISIT (OUTPATIENT)
Dept: GASTROENTEROLOGY | Facility: CLINIC | Age: 31
End: 2021-05-13
Payer: COMMERCIAL

## 2021-05-13 VITALS
HEART RATE: 70 BPM | SYSTOLIC BLOOD PRESSURE: 138 MMHG | RESPIRATION RATE: 16 BRPM | DIASTOLIC BLOOD PRESSURE: 82 MMHG | HEIGHT: 63 IN | BODY MASS INDEX: 37.03 KG/M2 | WEIGHT: 209 LBS

## 2021-05-13 DIAGNOSIS — K76.0 FATTY LIVER: ICD-10-CM

## 2021-05-13 DIAGNOSIS — K50.018 CROHN'S DISEASE OF ILEUM, OTHER COMPLICATION (HCC): ICD-10-CM

## 2021-05-13 DIAGNOSIS — K50.018 CROHN'S DISEASE OF ILEUM, OTHER COMPLICATION (HCC): Primary | ICD-10-CM

## 2021-05-13 DIAGNOSIS — K21.9 GERD WITHOUT ESOPHAGITIS: ICD-10-CM

## 2021-05-13 PROCEDURE — 36415 COLL VENOUS BLD VENIPUNCTURE: CPT

## 2021-05-13 PROCEDURE — 86480 TB TEST CELL IMMUN MEASURE: CPT

## 2021-05-13 PROCEDURE — 99214 OFFICE O/P EST MOD 30 MIN: CPT | Performed by: INTERNAL MEDICINE

## 2021-05-13 NOTE — TELEPHONE ENCOUNTER
Per  Previous note: [employer] denied to re auth Humira due to dosing schedule and that pt needs a TB test     Called pt to get his TB test asap  Pt will go for bw tomorrow morning

## 2021-05-13 NOTE — TELEPHONE ENCOUNTER
----- Message from John Sherwood MD sent at 5/13/2021  1:30 PM EDT -----  Pls make sure his Humira is paid for with his insurance- this is a chronic med and he got a denial  He is on weekly

## 2021-05-13 NOTE — PROGRESS NOTES
Follow-up Note -  [facility]  Alejandra James 1990 31 y o  male     ASSESSMENT @ PLAN:   He is a 65-year-old male with obesity with BMI of 37 0 with fatty liver disease  In addition he has small bowel Crohn's which is under control on weekly adalimumab and his reflux is under control as well  1 will continue to try to lose weight  We will need to recheck his liver enzymes on follow-up    2 he will continue on his weekly adalimumab  For some reason he got insurance denial and we will need to investigate this make sure he has no issues with continuing the medication    3 he will continue on his Protonix 40 mg    Reason:  GERD and small bowel Crohn's and obesity and fatty liver    HPI:  He is a 65-year-old male with GERD and obesity and fatty liver and small bowel Crohn's who is here for follow-up  His peptic symptoms are under control on pantoprazole  He has no heartburn regurgitation dysphagia  He has no nausea vomiting  He takes the pantoprazole every morning  He has been on oral once a day dose for at least a year now  His colon symptoms are rare and infrequent  He has no abdominal pain diarrhea or melena or hematochezia  He is on weekly adalimumab and he has no injection site problems or side effects from the medication  He is currently debating on whether get the COVID vaccine and we talked about this  He has fatty liver and has not really lost weight  His recent triglyceride is 168  His weight is 209 and his BMI is 37 02    REVIEW OF SYSTEMS:     CONSTITUTIONAL: Denies any fever, chills, or rigors  Good appetite, and no recent weight loss  HEENT: No earache or tinnitus  Denies hearing loss or visual disturbances  CARDIOVASCULAR: No chest pain or palpitations  RESPIRATORY: Denies any cough, hemoptysis, shortness of breath or dyspnea on exertion  GASTROINTESTINAL: As noted in the History of Present Illness  GENITOURINARY: No problems with urination   Denies any hematuria or dysuria  NEUROLOGIC: No dizziness or vertigo, denies headaches  MUSCULOSKELETAL: Denies any muscle or joint pain  SKIN: Denies skin rashes or itching  ENDOCRINE: Denies excessive thirst  Denies intolerance to heat or cold  PSYCHOSOCIAL: Denies depression or anxiety  Denies any recent memory loss  Past Medical History:   Diagnosis Date    GERD (gastroesophageal reflux disease)     H/O angina pectoris     Hyperlipidemia     Sleep apnea       Past Surgical History:   Procedure Laterality Date    CARPAL TUNNEL RELEASE      CHOLECYSTECTOMY      HERNIA REPAIR      [place] ESOPHAGOGASTRODUODENOSCOPY TRANSORAL DIAGNOSTIC N/A 4/20/2017    Procedure: EGD AND COLONOSCOPY;  Surgeon: Gwendolyn Ramirez MD;  Location: [facility];   Service: Gastroenterology    SHOULDER SURGERY Right      Social History     Socioeconomic History    Marital status: Single     Spouse name: Not on file    Number of children: Not on file    Years of education: Not on file    Highest education level: Not on file   Occupational History    Not on file   Social Needs    Financial resource strain: Not on file    Food insecurity     Worry: Not on file     Inability: Not on file    Transportation needs     Medical: Not on file     Non-medical: Not on file   Tobacco Use    Smoking status: Never Smoker    Smokeless tobacco: Never Used   Substance and Sexual Activity    Alcohol use: No    Drug use: No    Sexual activity: Not on file   Lifestyle    Physical activity     Days per week: Not on file     Minutes per session: Not on file    Stress: Not on file   Relationships    Social connections     Talks on phone: Not on file     Gets together: Not on file     Attends Mu-ism service: Not on file     Active member of club or organization: Not on file     Attends meetings of clubs or organizations: Not on file     Relationship status: Not on file    Intimate partner violence     Fear of current or ex partner: Not on file     Emotionally abused: Not on file     Physically abused: Not on file     Forced sexual activity: Not on file   Other Topics Concern    Not on file   Social History Narrative    Not on file     Family History   Problem Relation Age of Onset    Diabetes Mother      Aluminum, Bee venom, Ciprofloxacin, Clindamycin, Doxycycline, and Percocet [oxycodone-acetaminophen]  Current Outpatient Medications   Medication Sig Dispense Refill    albuterol (PROVENTIL HFA,VENTOLIN HFA) 90 mcg/act inhaler Inhale 2 puffs every 4 (four) hours as needed for shortness of breath 1 Inhaler 0    ezetimibe (ZETIA) 10 mg tablet Take 10 mg by mouth daily      fexofenadine (ALLEGRA) 60 MG tablet Take by mouth      Humira Pen 40 MG/0 8ML PNKT INJECT ONE PEN SUBCUTANEOUSLY EVERY WEEK  REFRIGERATE  4 each 8    pantoprazole (PROTONIX) 40 mg tablet Take 1 tablet (40 mg total) by mouth daily Before breakfast and dinner 60 tablet 1    atorvastatin (LIPITOR) 20 mg tablet atorvastatin 20 mg tablet      HYDROcodone-acetaminophen (XODOL) 7 5-300 MG per tablet TAKE 1 TABLET EVERY FOUR TO SIX HOURS AS NEEDED  0     No current facility-administered medications for this visit  Blood pressure 138/82, pulse 70, resp  rate 16, height 5' 3" (1 6 m), weight 94 8 kg (209 lb)  PHYSICAL EXAM:     General Appearance:   Alert, cooperative, no distress, appears stated age    HEENT:   Normocephalic, atraumatic, anicteric      Neck:  Supple, symmetrical, trachea midline, no adenopathy;    thyroid: no enlargement/tenderness/nodules; no carotid  bruit or JVD    Lungs:   Clear to auscultation bilaterally; no rales, rhonchi or wheezing; respirations unlabored    Heart[de-identified]   S1 and S2 normal; regular rate and rhythm; no murmur, rub, or gallop     Abdomen:   Soft, non-tender, non-distended; normal bowel sounds; no masses, no organomegaly    Genitalia:   Deferred    Rectal:   Deferred    Extremities:  No cyanosis, clubbing or edema    Pulses:  2+ and symmetric all extremities    Skin:  Skin color, texture, turgor normal, no rashes or lesions    Lymph nodes:  No palpable cervical, axillary or inguinal lymphadenopathy        Lab Results   Component Value Date    WBC 9 08 12/11/2019    HGB 15 8 12/11/2019    HCT 47 9 12/11/2019    MCV 88 12/11/2019     12/11/2019     Lab Results   Component Value Date    CALCIUM 9 0 11/20/2020    K 3 9 11/20/2020    CO2 30 11/20/2020     11/20/2020    BUN 17 11/20/2020    CREATININE 0 98 11/20/2020     Lab Results   Component Value Date     (H) 11/20/2020    AST 44 11/20/2020    ALKPHOS 61 11/20/2020     No results found for: INR, PROTIME Awake/Patient baseline mental status

## 2021-05-17 ENCOUNTER — TELEPHONE (OUTPATIENT)
Dept: GASTROENTEROLOGY | Facility: CLINIC | Age: 31
End: 2021-05-17

## 2021-05-17 LAB
GAMMA INTERFERON BACKGROUND BLD IA-ACNC: 0.16 IU/ML
M TB IFN-G BLD-IMP: NEGATIVE
M TB IFN-G CD4+ BCKGRND COR BLD-ACNC: 0 IU/ML
M TB IFN-G CD4+ BCKGRND COR BLD-ACNC: 0.03 IU/ML
MITOGEN IGNF BCKGRD COR BLD-ACNC: >10 IU/ML

## 2021-05-17 NOTE — TELEPHONE ENCOUNTER
Spoke with Emmanuel Meyer   at [address] she states we can appeal the denial they need copy of TB testing and documentation stating why Humira needs to be weekly    All clinicals and labs were sent will wait for a decision [id number]

## 2021-05-17 NOTE — TELEPHONE ENCOUNTER
----- Message from Tree Wayne PA-C sent at 5/17/2021 11:16 AM EDT -----  TB test negative per [employer]'s requirement to continue Humira

## 2021-05-24 ENCOUNTER — TELEPHONE (OUTPATIENT)
Dept: GASTROENTEROLOGY | Facility: CLINIC | Age: 31
End: 2021-05-24

## 2021-05-24 NOTE — TELEPHONE ENCOUNTER
Spoke with Amy Johnson from [facility] she advised she can ont see any correspondence for this patient or an appeal she confirmed the Appeal fax number that I have faxed the previous information to  [phone] she confirmed this is the correct fax number  She advised that I fax the information for the appeal again with note stating this has been submitted previously and the patient is waiting for his medication  Faxed sent with the above information attached

## 2021-05-26 ENCOUNTER — TELEPHONE (OUTPATIENT)
Dept: GASTROENTEROLOGY | Facility: CLINIC | Age: 31
End: 2021-05-26

## 2021-05-26 NOTE — TELEPHONE ENCOUNTER
Spoke with patient advised him  of the events that have taken place with his insurance company and advised I have submitted all the information and appeals and phone calls over the last several weeks and have not received any reply in reference to his Humira  I requested at this time that the patient contacts the insurance to submit a formal complaint  He will contact Shaila to do so

## 2021-05-26 NOTE — TELEPHONE ENCOUNTER
Left a message on the Appeal expedited prior authorization line  [phone] for a return call in reference to two previous appeals that were faxed to [address]  on 5/18/21 and 5/24/21 for patient's Humira  We have not received any reply for these appeals requested a return call ASAP  I left a voicemail for patient to call the office in reference to advise him of the above events in refeence to his Humira

## 2021-05-26 NOTE — TELEPHONE ENCOUNTER
Lee Monsalve - patient's mother, Will Hiwotbabatunde, is returning William's call   Please call Will Watts at [phone] ty

## 2021-06-02 ENCOUNTER — TELEPHONE (OUTPATIENT)
Dept: GASTROENTEROLOGY | Facility: CLINIC | Age: 31
End: 2021-06-02

## 2021-06-02 NOTE — TELEPHONE ENCOUNTER
Telephone Appeal Department again for an update of the status of patient's Humira  I advised there were two Appeals sent in and also two vocemails left on this voicemail for a return call to the office  Office has not received a reply telephone call or a return call again requested for a representative to return a call to our office with an update

## 2021-06-03 ENCOUNTER — TELEPHONE (OUTPATIENT)
Dept: GASTROENTEROLOGY | Facility: CLINIC | Age: 31
End: 2021-06-03

## 2021-06-03 NOTE — TELEPHONE ENCOUNTER
Spoke with [facility] at [facility] advised patient's Humira  Approved valid  5/27/21 until l5/27/22   ZO#[id number]

## 2021-10-14 ENCOUNTER — TELEPHONE (OUTPATIENT)
Dept: GASTROENTEROLOGY | Facility: CLINIC | Age: 31
End: 2021-10-14

## 2021-10-15 ENCOUNTER — TELEPHONE (OUTPATIENT)
Dept: GASTROENTEROLOGY | Facility: CLINIC | Age: 31
End: 2021-10-15

## 2021-10-15 DIAGNOSIS — K50.00 CROHN'S DISEASE INVOLVING TERMINAL ILEUM (HCC): ICD-10-CM

## 2021-10-15 RX ORDER — ADALIMUMAB 40MG/0.8ML
40 KIT SUBCUTANEOUS WEEKLY
Qty: 4 EACH | Refills: 8 | Status: SHIPPED | OUTPATIENT
Start: 2021-10-15 | End: 2022-06-08

## 2021-10-26 ENCOUNTER — TELEPHONE (OUTPATIENT)
Dept: GASTROENTEROLOGY | Facility: CLINIC | Age: 31
End: 2021-10-26

## 2021-10-27 ENCOUNTER — TELEPHONE (OUTPATIENT)
Dept: GASTROENTEROLOGY | Facility: CLINIC | Age: 31
End: 2021-10-27

## 2021-10-29 ENCOUNTER — TELEPHONE (OUTPATIENT)
Dept: GASTROENTEROLOGY | Facility: CLINIC | Age: 31
End: 2021-10-29

## 2021-11-01 ENCOUNTER — TELEPHONE (OUTPATIENT)
Dept: GASTROENTEROLOGY | Facility: CLINIC | Age: 31
End: 2021-11-01

## 2021-11-03 ENCOUNTER — TELEPHONE (OUTPATIENT)
Dept: GASTROENTEROLOGY | Facility: CLINIC | Age: 31
End: 2021-11-03

## 2022-01-13 ENCOUNTER — TELEPHONE (OUTPATIENT)
Dept: GASTROENTEROLOGY | Facility: CLINIC | Age: 32
End: 2022-01-13

## 2022-01-13 ENCOUNTER — APPOINTMENT (OUTPATIENT)
Dept: LAB | Facility: HOSPITAL | Age: 32
End: 2022-01-13
Payer: COMMERCIAL

## 2022-01-13 ENCOUNTER — OFFICE VISIT (OUTPATIENT)
Dept: GASTROENTEROLOGY | Facility: CLINIC | Age: 32
End: 2022-01-13
Payer: COMMERCIAL

## 2022-01-13 VITALS
DIASTOLIC BLOOD PRESSURE: 60 MMHG | BODY MASS INDEX: 36.36 KG/M2 | RESPIRATION RATE: 16 BRPM | HEART RATE: 65 BPM | WEIGHT: 205.2 LBS | SYSTOLIC BLOOD PRESSURE: 106 MMHG | HEIGHT: 63 IN | TEMPERATURE: 98.1 F | OXYGEN SATURATION: 95 %

## 2022-01-13 DIAGNOSIS — K76.0 FATTY LIVER: ICD-10-CM

## 2022-01-13 DIAGNOSIS — K21.9 GERD WITHOUT ESOPHAGITIS: ICD-10-CM

## 2022-01-13 DIAGNOSIS — K50.018 CROHN'S DISEASE OF SMALL INTESTINE WITH OTHER COMPLICATION (HCC): Primary | ICD-10-CM

## 2022-01-13 DIAGNOSIS — K50.018 CROHN'S DISEASE OF ILEUM, OTHER COMPLICATION (HCC): Primary | ICD-10-CM

## 2022-01-13 LAB
ALBUMIN SERPL BCP-MCNC: 3.8 G/DL (ref 3.5–5)
ALP SERPL-CCNC: 51 U/L (ref 46–116)
ALT SERPL W P-5'-P-CCNC: 52 U/L (ref 12–78)
ANION GAP SERPL CALCULATED.3IONS-SCNC: 11 MMOL/L (ref 4–13)
AST SERPL W P-5'-P-CCNC: 21 U/L (ref 5–45)
BASOPHILS # BLD AUTO: 0.04 THOUSANDS/ΜL (ref 0–0.1)
BASOPHILS NFR BLD AUTO: 1 % (ref 0–1)
BILIRUB SERPL-MCNC: 0.48 MG/DL (ref 0.2–1)
BUN SERPL-MCNC: 20 MG/DL (ref 5–25)
CALCIUM SERPL-MCNC: 8.8 MG/DL (ref 8.3–10.1)
CHLORIDE SERPL-SCNC: 104 MMOL/L (ref 100–108)
CO2 SERPL-SCNC: 29 MMOL/L (ref 21–32)
CREAT SERPL-MCNC: 0.97 MG/DL (ref 0.6–1.3)
CRP SERPL QL: <3 MG/L
EOSINOPHIL # BLD AUTO: 0.56 THOUSAND/ΜL (ref 0–0.61)
EOSINOPHIL NFR BLD AUTO: 6 % (ref 0–6)
ERYTHROCYTE [DISTWIDTH] IN BLOOD BY AUTOMATED COUNT: 13 % (ref 11.6–15.1)
ERYTHROCYTE [SEDIMENTATION RATE] IN BLOOD: 11 MM/HOUR (ref 0–14)
GFR SERPL CREATININE-BSD FRML MDRD: 102 ML/MIN/1.73SQ M
GLUCOSE P FAST SERPL-MCNC: 99 MG/DL (ref 65–99)
HCT VFR BLD AUTO: 42.8 % (ref 36.5–49.3)
HGB BLD-MCNC: 14.5 G/DL (ref 12–17)
IMM GRANULOCYTES # BLD AUTO: 0.02 THOUSAND/UL (ref 0–0.2)
IMM GRANULOCYTES NFR BLD AUTO: 0 % (ref 0–2)
LYMPHOCYTES # BLD AUTO: 3.13 THOUSANDS/ΜL (ref 0.6–4.47)
LYMPHOCYTES NFR BLD AUTO: 35 % (ref 14–44)
MCH RBC QN AUTO: 28.7 PG (ref 26.8–34.3)
MCHC RBC AUTO-ENTMCNC: 33.9 G/DL (ref 31.4–37.4)
MCV RBC AUTO: 85 FL (ref 82–98)
MONOCYTES # BLD AUTO: 0.7 THOUSAND/ΜL (ref 0.17–1.22)
MONOCYTES NFR BLD AUTO: 8 % (ref 4–12)
NEUTROPHILS # BLD AUTO: 4.41 THOUSANDS/ΜL (ref 1.85–7.62)
NEUTS SEG NFR BLD AUTO: 50 % (ref 43–75)
NRBC BLD AUTO-RTO: 0 /100 WBCS
PLATELET # BLD AUTO: 340 THOUSANDS/UL (ref 149–390)
PMV BLD AUTO: 9.5 FL (ref 8.9–12.7)
POTASSIUM SERPL-SCNC: 3.7 MMOL/L (ref 3.5–5.3)
PROT SERPL-MCNC: 7.3 G/DL (ref 6.4–8.2)
RBC # BLD AUTO: 5.06 MILLION/UL (ref 3.88–5.62)
SODIUM SERPL-SCNC: 144 MMOL/L (ref 136–145)
WBC # BLD AUTO: 8.86 THOUSAND/UL (ref 4.31–10.16)

## 2022-01-13 PROCEDURE — 80053 COMPREHEN METABOLIC PANEL: CPT

## 2022-01-13 PROCEDURE — 36415 COLL VENOUS BLD VENIPUNCTURE: CPT

## 2022-01-13 PROCEDURE — 85652 RBC SED RATE AUTOMATED: CPT

## 2022-01-13 PROCEDURE — 86140 C-REACTIVE PROTEIN: CPT

## 2022-01-13 PROCEDURE — 85025 COMPLETE CBC W/AUTO DIFF WBC: CPT

## 2022-01-13 PROCEDURE — 99204 OFFICE O/P NEW MOD 45 MIN: CPT | Performed by: INTERNAL MEDICINE

## 2022-01-13 NOTE — PROGRESS NOTES
Follow-up Note -  [facility]  Chanda Gilliam 1990 27 y o  male     ASSESSMENT @ PLAN:   Is a 34-year-old male with small bowel Crohn's in remission on weekly adalimumab  In addition he has gastroesophageal reflux disease that is under control as well on Protonix 40 mg daily  He is here with his mother is overall doing well     1 he will continue on the weekly adalimumab    2 will continue on the daily Protonix    3 he will continue to work on weight loss    4 will send for lab work    Reason:  Small bowel Crohn's and GERD    HPI:  He is a 34-year-old male with GERD and small bowel Crohn's who is here for follow-up  He continues to be in remission and is doing well  He is on Protonix 40 mg daily  He has no chest pain dysphagia heartburn regurgitation no nausea no vomiting  In addition his Crohn's is under control  He has 1-2 bowel movements a day  He has no blood  He has rare periumbilical abdominal cramping that is not following pattern  Is not in the days preceding his injection  He has not had blood work in over a year and a half  REVIEW OF SYSTEMS:     CONSTITUTIONAL: Denies any fever, chills, or rigors  Good appetite, and no recent weight loss  HEENT: No earache or tinnitus  Denies hearing loss or visual disturbances  CARDIOVASCULAR: No chest pain or palpitations  RESPIRATORY: Denies any cough, hemoptysis, shortness of breath or dyspnea on exertion  GASTROINTESTINAL: As noted in the History of Present Illness  GENITOURINARY: No problems with urination  Denies any hematuria or dysuria  NEUROLOGIC: No dizziness or vertigo, denies headaches  MUSCULOSKELETAL: Denies any muscle or joint pain  SKIN: Denies skin rashes or itching  ENDOCRINE: Denies excessive thirst  Denies intolerance to heat or cold  PSYCHOSOCIAL: Denies depression or anxiety  Denies any recent memory loss       Past Medical History:   Diagnosis Date    GERD (gastroesophageal reflux disease)     H/O angina pectoris     Hyperlipidemia     Sleep apnea       Past Surgical History:   Procedure Laterality Date    CARPAL TUNNEL RELEASE      CHOLECYSTECTOMY      HERNIA REPAIR      [place] ESOPHAGOGASTRODUODENOSCOPY TRANSORAL DIAGNOSTIC N/A 4/20/2017    Procedure: EGD AND COLONOSCOPY;  Surgeon: Thompson Burdick MD;  Location: [facility];   Service: Gastroenterology    SHOULDER SURGERY Right      Social History     Socioeconomic History    Marital status: Single     Spouse name: Not on file    Number of children: Not on file    Years of education: Not on file    Highest education level: Not on file   Occupational History    Not on file   Tobacco Use    Smoking status: Never Smoker    Smokeless tobacco: Never Used   Vaping Use    Vaping Use: Never used   Substance and Sexual Activity    Alcohol use: No    Drug use: No    Sexual activity: Not on file   Other Topics Concern    Not on file   Social History Narrative    Not on file     Social Determinants of Health     Financial Resource Strain: Not on file   Food Insecurity: Not on file   Transportation Needs: Not on file   Physical Activity: Not on file   Stress: Not on file   Social Connections: Not on file   Intimate Partner Violence: Not on file   Housing Stability: Not on file     Family History   Problem Relation Age of Onset    Diabetes Mother      Aluminum, Bee venom, Ciprofloxacin, Clindamycin, Doxycycline, and Percocet [oxycodone-acetaminophen]  Current Outpatient Medications   Medication Sig Dispense Refill    Adalimumab (Humira Pen) 40 MG/0 8ML PNKT Inject 0 8 mL (40 mg total) under the skin once a week 4 each 8    ezetimibe (ZETIA) 10 mg tablet Take 10 mg by mouth daily      fexofenadine (ALLEGRA) 60 MG tablet Take by mouth as needed        pantoprazole (PROTONIX) 40 mg tablet Take 1 tablet (40 mg total) by mouth daily Before breakfast and dinner 60 tablet 1    albuterol (PROVENTIL HFA,VENTOLIN HFA) 90 mcg/act inhaler Inhale 2 puffs every 4 (four) hours as needed for shortness of breath (Patient not taking: Reported on 1/13/2022 ) 1 Inhaler 0     No current facility-administered medications for this visit  Blood pressure 106/60, pulse 65, temperature 98 1 °F (36 7 °C), temperature source Temporal, resp  rate 16, height 5' 3" (1 6 m), weight 93 1 kg (205 lb 3 2 oz), SpO2 95 %  PHYSICAL EXAM:     General Appearance:   Alert, cooperative, no distress, appears stated age    HEENT:   Normocephalic, atraumatic, anicteric      Neck:  Supple, symmetrical, trachea midline, no adenopathy;    thyroid: no enlargement/tenderness/nodules; no carotid  bruit or JVD    Lungs:   Clear to auscultation bilaterally; no rales, rhonchi or wheezing; respirations unlabored    Heart[de-identified]   S1 and S2 normal; regular rate and rhythm; no murmur, rub, or gallop     Abdomen:   Soft, non-tender, non-distended; normal bowel sounds; no masses, no organomegaly    Genitalia:   Deferred    Rectal:   Deferred    Extremities:  No cyanosis, clubbing or edema    Pulses:  2+ and symmetric all extremities    Skin:  Skin color, texture, turgor normal, no rashes or lesions    Lymph nodes:  No palpable cervical, axillary or inguinal lymphadenopathy        Lab Results   Component Value Date    WBC 9 08 12/11/2019    HGB 15 8 12/11/2019    HCT 47 9 12/11/2019    MCV 88 12/11/2019     12/11/2019     Lab Results   Component Value Date    CALCIUM 9 0 11/20/2020    K 3 9 11/20/2020    CO2 30 11/20/2020     11/20/2020    BUN 17 11/20/2020    CREATININE 0 98 11/20/2020     Lab Results   Component Value Date     (H) 11/20/2020    AST 44 11/20/2020    ALKPHOS 61 11/20/2020     No results found for: INR, PROTIME

## 2022-01-13 NOTE — TELEPHONE ENCOUNTER
----- Message from Lynn Li MD sent at 1/13/2022  3:23 PM EST -----  Please tell him that all of his blood work looks great  There is no inflammation is liver and kidney numbers look good

## 2022-06-08 DIAGNOSIS — K50.00 CROHN'S DISEASE INVOLVING TERMINAL ILEUM (HCC): ICD-10-CM

## 2022-06-08 RX ORDER — ADALIMUMAB 40MG/0.8ML
40 KIT SUBCUTANEOUS WEEKLY
Qty: 4 EACH | Refills: 8 | Status: SHIPPED | OUTPATIENT
Start: 2022-06-08

## 2022-07-11 RX ORDER — PREDNISONE 20 MG/1
TABLET ORAL
COMMUNITY
Start: 2022-04-23 | End: 2022-07-14

## 2022-07-14 ENCOUNTER — OFFICE VISIT (OUTPATIENT)
Dept: GASTROENTEROLOGY | Facility: CLINIC | Age: 32
End: 2022-07-14
Payer: COMMERCIAL

## 2022-07-14 VITALS
DIASTOLIC BLOOD PRESSURE: 82 MMHG | WEIGHT: 197 LBS | SYSTOLIC BLOOD PRESSURE: 122 MMHG | HEART RATE: 65 BPM | HEIGHT: 62 IN | BODY MASS INDEX: 36.25 KG/M2

## 2022-07-14 DIAGNOSIS — K50.018 CROHN'S DISEASE OF ILEUM, OTHER COMPLICATION (HCC): Primary | ICD-10-CM

## 2022-07-14 PROCEDURE — 99214 OFFICE O/P EST MOD 30 MIN: CPT | Performed by: INTERNAL MEDICINE

## 2022-07-14 RX ORDER — BUDESONIDE 3 MG/1
9 CAPSULE, COATED PELLETS ORAL DAILY
Qty: 30 CAPSULE | Refills: 3 | Status: SHIPPED | OUTPATIENT
Start: 2022-07-14 | End: 2022-09-29

## 2022-07-14 NOTE — PROGRESS NOTES
Follow-up Note -  [facility]  Sophie Liang 1990 34 y o  male     ASSESSMENT @ PLAN:   He is a 26-year-old male with longstanding gastroesophageal reflux disease and mild small bowel Crohn's with periumbilical abdominal discomfort and tingling in his abdomen with discomfort with normal bowel habits  He is on weekly adalimumab and pantoprazole 40 mg p o  b i d  His reflux is quiescent  He had endoscopy colonoscopy with me in April 2017  The endoscopy was normal   The colonoscopy was showed a normal colon with terminal ileum Crohn's     1 he will continue on his weekly adalimumab; I will check a drug level and antibody level    2 I am going to give him a budesonide course    3 he will continue on his Protonix 40 mg p o  b i d  with continued instruction is a losing weight    Reason:  GERD and small bowel Crohn's    HPI:  He is a 26-year-old male with GERD and small bowel Crohn's who is reflux is quiescent and Crohn's is active  His reflux is not bothering him  He has no heartburn regurgitation no solid or liquid food dysphagia no globus no burping belching nausea vomiting he has no upper abdominal discomfort  He had endoscopy with me that was normal in 2017  Biopsies were negative for H pylori and celiac disease  He had a colonoscopy at the same time that showed terminal ileum ulcerations small bowel Crohn's and a normal colon  He was started on adalimumab and 8 weeks he did not have a good response and had low drug level so he went to weekly  He was stable on this regimen for years  Recently in the last few months he reports tingling and periumbilical abdominal discomfort  It is worsened at the end of the week after his injection  His bowel habits are normal   He has no diarrhea constipation melena hematochezia  His weight is stable  His BMI is 36 0    REVIEW OF SYSTEMS:     CONSTITUTIONAL: Denies any fever, chills, or rigors  Good appetite, and no recent weight loss    HEENT: No earache or tinnitus  Denies hearing loss or visual disturbances  CARDIOVASCULAR: No chest pain or palpitations  RESPIRATORY: Denies any cough, hemoptysis, shortness of breath or dyspnea on exertion  GASTROINTESTINAL: As noted in the History of Present Illness  GENITOURINARY: No problems with urination  Denies any hematuria or dysuria  NEUROLOGIC: No dizziness or vertigo, denies headaches  MUSCULOSKELETAL: Denies any muscle or joint pain  SKIN: Denies skin rashes or itching  ENDOCRINE: Denies excessive thirst  Denies intolerance to heat or cold  PSYCHOSOCIAL: Denies depression or anxiety  Denies any recent memory loss  Past Medical History:   Diagnosis Date    GERD (gastroesophageal reflux disease)     H/O angina pectoris     Hyperlipidemia     Sleep apnea       Past Surgical History:   Procedure Laterality Date    CARPAL TUNNEL RELEASE      CHOLECYSTECTOMY      HERNIA REPAIR      [place] ESOPHAGOGASTRODUODENOSCOPY TRANSORAL DIAGNOSTIC N/A 4/20/2017    Procedure: EGD AND COLONOSCOPY;  Surgeon: Argentina Covarrubias MD;  Location: [facility];   Service: Gastroenterology    SHOULDER SURGERY Right      Social History     Socioeconomic History    Marital status: Single     Spouse name: Not on file    Number of children: Not on file    Years of education: Not on file    Highest education level: Not on file   Occupational History    Not on file   Tobacco Use    Smoking status: Never Smoker    Smokeless tobacco: Never Used   Vaping Use    Vaping Use: Never used   Substance and Sexual Activity    Alcohol use: No    Drug use: No    Sexual activity: Not on file   Other Topics Concern    Not on file   Social History Narrative    Not on file     Social Determinants of Health     Financial Resource Strain: Not on file   Food Insecurity: Not on file   Transportation Needs: Not on file   Physical Activity: Not on file   Stress: Not on file   Social Connections: Not on file   Intimate Partner Violence: Not on file   Housing Stability: Not on file     Family History   Problem Relation Age of Onset    Diabetes Mother      Aluminum, Bee venom, Ciprofloxacin, Clindamycin, Doxycycline, Oxycodone-acetaminophen, and Percocet [oxycodone-acetaminophen]  Current Outpatient Medications   Medication Sig Dispense Refill    albuterol (PROVENTIL HFA,VENTOLIN HFA) 90 mcg/act inhaler Inhale 2 puffs every 4 (four) hours as needed for shortness of breath 1 Inhaler 0    budesonide (ENTOCORT EC) 3 MG capsule Take 3 capsules (9 mg total) by mouth daily 30 capsule 3    ezetimibe (ZETIA) 10 mg tablet Take 10 mg by mouth daily      fexofenadine (ALLEGRA) 60 MG tablet Take by mouth as needed        Humira Pen 40 MG/0 8ML PNKT INJECT 0 8 ML (40 MG TOTAL) UNDER THE SKIN ONCE A WEEK  4 each 8    pantoprazole (PROTONIX) 40 mg tablet Take 1 tablet (40 mg total) by mouth daily Before breakfast and dinner 60 tablet 1    predniSONE 20 mg tablet PLEASE SEE ATTACHED FOR DETAILED DIRECTIONS (Patient not taking: Reported on 7/14/2022)       No current facility-administered medications for this visit  Blood pressure 122/82, pulse 65, height 5' 2" (1 575 m), weight 89 4 kg (197 lb)  PHYSICAL EXAM:     General Appearance:   Alert, cooperative, no distress, appears stated age    HEENT:   Normocephalic, atraumatic, anicteric      Neck:  Supple, symmetrical, trachea midline, no adenopathy;    thyroid: no enlargement/tenderness/nodules; no carotid  bruit or JVD    Lungs:   Clear to auscultation bilaterally; no rales, rhonchi or wheezing; respirations unlabored    Heart[de-identified]   S1 and S2 normal; regular rate and rhythm; no murmur, rub, or gallop     Abdomen:   Soft, non-tender, non-distended; normal bowel sounds; no masses, no organomegaly    Genitalia:   Deferred    Rectal:   Deferred    Extremities:  No cyanosis, clubbing or edema    Pulses:  2+ and symmetric all extremities    Skin:  Skin color, texture, turgor normal, no rashes or lesions    Lymph nodes:  No palpable cervical, axillary or inguinal lymphadenopathy        Lab Results   Component Value Date    WBC 8 86 01/13/2022    HGB 14 5 01/13/2022    HCT 42 8 01/13/2022    MCV 85 01/13/2022     01/13/2022     Lab Results   Component Value Date    CALCIUM 8 8 01/13/2022    K 3 7 01/13/2022    CO2 29 01/13/2022     01/13/2022    BUN 20 01/13/2022    CREATININE 0 97 01/13/2022     Lab Results   Component Value Date    ALT 52 01/13/2022    AST 21 01/13/2022    ALKPHOS 51 01/13/2022     No results found for: INR, PROTIME

## 2022-07-16 ENCOUNTER — APPOINTMENT (OUTPATIENT)
Dept: LAB | Facility: HOSPITAL | Age: 32
End: 2022-07-16
Payer: COMMERCIAL

## 2022-07-16 DIAGNOSIS — K50.018 CROHN'S DISEASE OF SMALL INTESTINE WITH OTHER COMPLICATION (HCC): Primary | ICD-10-CM

## 2022-07-16 PROCEDURE — 82397 CHEMILUMINESCENT ASSAY: CPT

## 2022-07-16 PROCEDURE — 80145 DRUG ASSAY ADALIMUMAB: CPT

## 2022-07-16 PROCEDURE — 36415 COLL VENOUS BLD VENIPUNCTURE: CPT

## 2022-07-23 LAB
ADALIMUMAB AB SERPL-MCNC: <25 NG/ML
ADALIMUMAB SERPL-MCNC: 14 UG/ML

## 2022-07-25 ENCOUNTER — TELEPHONE (OUTPATIENT)
Dept: GASTROENTEROLOGY | Facility: CLINIC | Age: 32
End: 2022-07-25

## 2022-07-25 NOTE — TELEPHONE ENCOUNTER
----- Message from Emily Matias MD sent at 7/23/2022  9:23 AM EDT -----  Please call him and tell him that his drug level for Humira is perfect  It is at a good level  Please tell him there are no antibodies    Please tell him I expect him to improve on the budesonide and that we will continue on the Humira

## 2022-07-25 NOTE — TELEPHONE ENCOUNTER
Called and LMOM with results- drug level for Humira is perfect and that it is at a good level  There are no antibodies  Dr Aylin Contreras expects him to improve with the budesonide and to continue with the Humira

## 2022-09-22 ENCOUNTER — HOSPITAL ENCOUNTER (EMERGENCY)
Facility: HOSPITAL | Age: 32
Discharge: HOME | End: 2022-09-22
Attending: EMERGENCY MEDICINE
Payer: COMMERCIAL

## 2022-09-22 VITALS
SYSTOLIC BLOOD PRESSURE: 151 MMHG | DIASTOLIC BLOOD PRESSURE: 92 MMHG | TEMPERATURE: 98 F | RESPIRATION RATE: 18 BRPM | OXYGEN SATURATION: 99 % | HEART RATE: 85 BPM

## 2022-09-22 DIAGNOSIS — L02.91 ABSCESS: Primary | ICD-10-CM

## 2022-09-22 LAB
ALBUMIN SERPL BCP-MCNC: 3.9 G/DL (ref 3.5–5)
ALP SERPL-CCNC: 67 U/L (ref 46–116)
ALT SERPL W P-5'-P-CCNC: 48 U/L (ref 12–78)
ANION GAP SERPL CALCULATED.3IONS-SCNC: 8 MMOL/L (ref 4–13)
AST SERPL W P-5'-P-CCNC: 23 U/L (ref 5–45)
BASOPHILS # BLD AUTO: 0.06 THOUSANDS/ΜL (ref 0–0.1)
BASOPHILS NFR BLD AUTO: 1 % (ref 0–1)
BILIRUB SERPL-MCNC: 0.43 MG/DL (ref 0.2–1)
BUN SERPL-MCNC: 15 MG/DL (ref 5–25)
CALCIUM SERPL-MCNC: 9.3 MG/DL (ref 8.3–10.1)
CHLORIDE SERPL-SCNC: 102 MMOL/L (ref 96–108)
CO2 SERPL-SCNC: 30 MMOL/L (ref 21–32)
CREAT SERPL-MCNC: 0.91 MG/DL (ref 0.6–1.3)
CRP SERPL QL: 36.6 MG/L
EOSINOPHIL # BLD AUTO: 0.33 THOUSAND/ΜL (ref 0–0.61)
EOSINOPHIL NFR BLD AUTO: 3 % (ref 0–6)
ERYTHROCYTE [DISTWIDTH] IN BLOOD BY AUTOMATED COUNT: 13.2 % (ref 11.6–15.1)
GFR SERPL CREATININE-BSD FRML MDRD: 110 ML/MIN/1.73SQ M
GLUCOSE SERPL-MCNC: 122 MG/DL (ref 65–140)
HCT VFR BLD AUTO: 45.5 % (ref 36.5–49.3)
HGB BLD-MCNC: 15.2 G/DL (ref 12–17)
IMM GRANULOCYTES # BLD AUTO: 0.05 THOUSAND/UL (ref 0–0.2)
IMM GRANULOCYTES NFR BLD AUTO: 0 % (ref 0–2)
LYMPHOCYTES # BLD AUTO: 2.43 THOUSANDS/ΜL (ref 0.6–4.47)
LYMPHOCYTES NFR BLD AUTO: 19 % (ref 14–44)
MCH RBC QN AUTO: 29.2 PG (ref 26.8–34.3)
MCHC RBC AUTO-ENTMCNC: 33.4 G/DL (ref 31.4–37.4)
MCV RBC AUTO: 87 FL (ref 82–98)
MONOCYTES # BLD AUTO: 0.82 THOUSAND/ΜL (ref 0.17–1.22)
MONOCYTES NFR BLD AUTO: 6 % (ref 4–12)
NEUTROPHILS # BLD AUTO: 9.14 THOUSANDS/ΜL (ref 1.85–7.62)
NEUTS SEG NFR BLD AUTO: 71 % (ref 43–75)
NRBC BLD AUTO-RTO: 0 /100 WBCS
PLATELET # BLD AUTO: 290 THOUSANDS/UL (ref 149–390)
PMV BLD AUTO: 9.4 FL (ref 8.9–12.7)
POTASSIUM SERPL-SCNC: 4.4 MMOL/L (ref 3.5–5.3)
PROT SERPL-MCNC: 8.2 G/DL (ref 6.4–8.4)
RBC # BLD AUTO: 5.21 MILLION/UL (ref 3.88–5.62)
SODIUM SERPL-SCNC: 140 MMOL/L (ref 135–147)
WBC # BLD AUTO: 12.83 THOUSAND/UL (ref 4.31–10.16)

## 2022-09-22 PROCEDURE — 96375 TX/PRO/DX INJ NEW DRUG ADDON: CPT

## 2022-09-22 PROCEDURE — 96365 THER/PROPH/DIAG IV INF INIT: CPT

## 2022-09-22 PROCEDURE — 85025 COMPLETE CBC W/AUTO DIFF WBC: CPT | Performed by: EMERGENCY MEDICINE

## 2022-09-22 PROCEDURE — 99284 EMERGENCY DEPT VISIT MOD MDM: CPT | Performed by: EMERGENCY MEDICINE

## 2022-09-22 PROCEDURE — 99283 EMERGENCY DEPT VISIT LOW MDM: CPT

## 2022-09-22 PROCEDURE — 36415 COLL VENOUS BLD VENIPUNCTURE: CPT | Performed by: EMERGENCY MEDICINE

## 2022-09-22 PROCEDURE — 86140 C-REACTIVE PROTEIN: CPT | Performed by: EMERGENCY MEDICINE

## 2022-09-22 PROCEDURE — 80053 COMPREHEN METABOLIC PANEL: CPT | Performed by: EMERGENCY MEDICINE

## 2022-09-22 RX ORDER — METHYLPREDNISOLONE SODIUM SUCCINATE 125 MG/2ML
125 INJECTION, POWDER, LYOPHILIZED, FOR SOLUTION INTRAMUSCULAR; INTRAVENOUS ONCE
Status: COMPLETED | OUTPATIENT
Start: 2022-09-22 | End: 2022-09-22

## 2022-09-22 RX ORDER — CEPHALEXIN 500 MG/1
500 CAPSULE ORAL EVERY 6 HOURS SCHEDULED
Qty: 28 CAPSULE | Refills: 0 | Status: SHIPPED | OUTPATIENT
Start: 2022-09-22 | End: 2022-09-30

## 2022-09-22 RX ORDER — MORPHINE SULFATE 4 MG/ML
4 INJECTION, SOLUTION INTRAMUSCULAR; INTRAVENOUS ONCE
Status: DISCONTINUED | OUTPATIENT
Start: 2022-09-22 | End: 2022-09-22 | Stop reason: HOSPADM

## 2022-09-22 RX ORDER — HYDROCODONE BITARTRATE AND ACETAMINOPHEN 5; 325 MG/1; MG/1
1 TABLET ORAL EVERY 6 HOURS PRN
Qty: 15 TABLET | Refills: 0 | Status: SHIPPED | OUTPATIENT
Start: 2022-09-22 | End: 2022-10-02

## 2022-09-22 RX ADMIN — METHYLPREDNISOLONE SODIUM SUCCINATE 125 MG: 125 INJECTION, POWDER, FOR SOLUTION INTRAMUSCULAR; INTRAVENOUS at 13:42

## 2022-09-22 RX ADMIN — CEFTRIAXONE SODIUM 2000 MG: 10 INJECTION, POWDER, FOR SOLUTION INTRAVENOUS at 13:42

## 2022-09-22 NOTE — ED PROVIDER NOTES
History  Chief Complaint   Patient presents with    Mass     Lump to R groin; doubled in size and is painful today      This young man has underlying history of Crohn's disease and diabetes and presents emergency department for a swollen tender area to his right groin  It has been there for about 4 days now and he got acutely worse overnight doubling in size  No fever no chills no nausea no vomiting  The mass is tender to palpation  It is nonfluctuant  He has some redness to the skin right above the area of swelling  Patient has had a left inguinal hernia in the past     Pain is constant, moderate, no radiation      History provided by:  Patient   used: No        Prior to Admission Medications   Prescriptions Last Dose Informant Patient Reported? Taking? Humira Pen 40 MG/0 8ML PNKT   No No   Sig: INJECT 0 8 ML (40 MG TOTAL) UNDER THE SKIN ONCE A WEEK  albuterol (PROVENTIL HFA,VENTOLIN HFA) 90 mcg/act inhaler  Self No No   Sig: Inhale 2 puffs every 4 (four) hours as needed for shortness of breath   budesonide (ENTOCORT EC) 3 MG capsule   No No   Sig: Take 3 capsules (9 mg total) by mouth daily   ezetimibe (ZETIA) 10 mg tablet  Self Yes No   Sig: Take 10 mg by mouth daily   fexofenadine (ALLEGRA) 60 MG tablet  Self Yes No   Sig: Take by mouth as needed     pantoprazole (PROTONIX) 40 mg tablet  Self No No   Sig: Take 1 tablet (40 mg total) by mouth daily Before breakfast and dinner      Facility-Administered Medications: None       Past Medical History:   Diagnosis Date    GERD (gastroesophageal reflux disease)     H/O angina pectoris     Hyperlipidemia     Sleep apnea        Past Surgical History:   Procedure Laterality Date    CARPAL TUNNEL RELEASE      CHOLECYSTECTOMY      HERNIA REPAIR      [place] ESOPHAGOGASTRODUODENOSCOPY TRANSORAL DIAGNOSTIC N/A 4/20/2017    Procedure: EGD AND COLONOSCOPY;  Surgeon: Argentina Covarrubias MD;  Location: [facility];   Service: Gastroenterology   [facility] SHOULDER SURGERY Right        Family History   Problem Relation Age of Onset    Diabetes Mother      I have reviewed and agree with the history as documented  E-Cigarette/Vaping    E-Cigarette Use Never User      E-Cigarette/Vaping Substances     Social History     Tobacco Use    Smoking status: Never Smoker    Smokeless tobacco: Never Used   Vaping Use    Vaping Use: Never used   Substance Use Topics    Alcohol use: No    Drug use: No       Review of Systems   Constitutional: Negative for chills and fever  HENT: Negative for ear pain and sore throat  Eyes: Negative for pain and visual disturbance  Respiratory: Negative for cough and shortness of breath  Cardiovascular: Negative for chest pain and palpitations  Gastrointestinal: Negative for abdominal pain and vomiting  Genitourinary: Negative for dysuria and hematuria  Musculoskeletal: Negative for arthralgias and back pain  Skin: Negative for color change and rash  Neurological: Negative for seizures and syncope  All other systems reviewed and are negative  Physical Exam  Physical Exam  Vitals and nursing note reviewed  Constitutional:       Appearance: He is well-developed  HENT:      Head: Normocephalic and atraumatic  Mouth/Throat:      Mouth: Mucous membranes are moist    Eyes:      Extraocular Movements: Extraocular movements intact  Conjunctiva/sclera: Conjunctivae normal       Pupils: Pupils are equal, round, and reactive to light  Cardiovascular:      Rate and Rhythm: Normal rate and regular rhythm  Pulses: Normal pulses  Heart sounds: Normal heart sounds  No murmur heard  Pulmonary:      Effort: Pulmonary effort is normal  No respiratory distress  Breath sounds: Normal breath sounds  Abdominal:      General: Abdomen is flat  Palpations: Abdomen is soft  Tenderness: There is no abdominal tenderness        Comments: Patient does have a mass on the right groin that is palpable and movable  It is tender to palpation  Freely movable, NON fluctuant   Musculoskeletal:      Cervical back: Neck supple  Skin:     General: Skin is warm and dry  Neurological:      Mental Status: He is alert           Vital Signs  ED Triage Vitals [09/22/22 1222]   Temperature Pulse Respirations Blood Pressure SpO2   98 °F (36 7 °C) 85 18 151/92 99 %      Temp src Heart Rate Source Patient Position - Orthostatic VS BP Location FiO2 (%)   -- -- -- -- --      Pain Score       --           Vitals:    09/22/22 1222   BP: 151/92   Pulse: 85         Visual Acuity      ED Medications  Medications   ceftriaxone (ROCEPHIN) 2 g/50 mL in dextrose IVPB (0 mg Intravenous Stopped 9/22/22 1412)   methylPREDNISolone sodium succinate (Solu-MEDROL) injection 125 mg (125 mg Intravenous Given 9/22/22 1342)       Diagnostic Studies  Results Reviewed     Procedure Component Value Units Date/Time    C-reactive protein [[id number]]  (Abnormal) Collected: 09/22/22 1258    Lab Status: Final result Specimen: Blood from Arm, Right Updated: 09/22/22 1417     CRP 36 6 mg/L     Comprehensive metabolic panel [[id number]] Collected: 09/22/22 1258    Lab Status: Final result Specimen: Blood from Arm, Right Updated: 09/22/22 1338     Sodium 140 mmol/L      Potassium 4 4 mmol/L      Chloride 102 mmol/L      CO2 30 mmol/L      ANION GAP 8 mmol/L      BUN 15 mg/dL      Creatinine 0 91 mg/dL      Glucose 122 mg/dL      Calcium 9 3 mg/dL      AST 23 U/L      ALT 48 U/L      Alkaline Phosphatase 67 U/L      Total Protein 8 2 g/dL      Albumin 3 9 g/dL      Total Bilirubin 0 43 mg/dL      eGFR 110 ml/min/1 73sq m     Narrative:      [place] guidelines for Chronic Kidney Disease (CKD):     Stage 1 with normal or high GFR (GFR > 90 mL/min/1 73 square meters)    Stage 2 Mild CKD (GFR = 60-89 mL/min/1 73 square meters)    Stage 3A Moderate CKD (GFR = 45-59 mL/min/1 73 square meters)    Stage 3B Moderate CKD (GFR = 30-44 mL/min/1 73 square meters)    Stage 4 Severe CKD (GFR = 15-29 mL/min/1 73 square meters)    Stage 5 End Stage CKD (GFR <15 mL/min/1 73 square meters)  Note: GFR calculation is accurate only with a steady state creatinine    CBC and differential [[id number]]  (Abnormal) Collected: 09/22/22 1258    Lab Status: Final result Specimen: Blood from Arm, Right Updated: 09/22/22 1309     WBC 12 83 Thousand/uL      RBC 5 21 Million/uL      Hemoglobin 15 2 g/dL      Hematocrit 45 5 %      MCV 87 fL      MCH 29 2 pg      MCHC 33 4 g/dL      RDW 13 2 %      MPV 9 4 fL      Platelets 387 Thousands/uL      nRBC 0 /100 WBCs      Neutrophils Relative 71 %      Immat GRANS % 0 %      Lymphocytes Relative 19 %      Monocytes Relative 6 %      Eosinophils Relative 3 %      Basophils Relative 1 %      Neutrophils Absolute 9 14 Thousands/µL      Immature Grans Absolute 0 05 Thousand/uL      Lymphocytes Absolute 2 43 Thousands/µL      Monocytes Absolute 0 82 Thousand/µL      Eosinophils Absolute 0 33 Thousand/µL      Basophils Absolute 0 06 Thousands/µL                  No orders to display              Procedures  Procedures         ED Course  ED Course as of 09/22/22 1729   Thu Sep 22, 2022   1318 WBC(!): 12 83   1344 Sodium: 140   1344 BUN: 15   1344 Creatinine: 0 91                                             MDM  Number of Diagnoses or Management Options     Amount and/or Complexity of Data Reviewed  Clinical lab tests: ordered and reviewed    Risk of Complications, Morbidity, and/or Mortality  Presenting problems: moderate  Diagnostic procedures: moderate  Management options: moderate  General comments: This looks more like a soft tissue infection with lymph node inflammation or abscess formation  Patient will receive IV antibiotics in the emergency department as well as IV steroids  And patient will have a mandatory recheck in 48 hours      Patient Progress  Patient progress: stable      Disposition  Final diagnoses:   Abscess Time reflects when diagnosis was documented in both MDM as applicable and the Disposition within this note     Time User Action Codes Description Comment    9/22/2022  1:26 PM Mirna Sepulveda Add [L02 91] Abscess       ED Disposition     ED Disposition   Discharge    Condition   Stable    Date/Time   Thu Sep 22, 2022  1:26 PM    Comment   Chanda Mane discharge to home/self care                 Follow-up Information     Follow up With Specialties Details Why Contact Info Additional Information    [facility] Emergency Department Emergency Medicine In 2 days  [address] [id number] [facility] Emergency Department, [address], [place], [address]          Discharge Medication List as of 9/22/2022  1:29 PM      START taking these medications    Details   cephalexin (KEFLEX) 500 mg capsule Take 1 capsule (500 mg total) by mouth every 6 (six) hours for 7 days, Starting Thu 9/22/2022, Until Thu 9/29/2022, Normal      HYDROcodone-acetaminophen (Norco) 5-325 mg per tablet Take 1 tablet by mouth every 6 (six) hours as needed for pain for up to 10 days Max Daily Amount: 4 tablets, Starting Thu 9/22/2022, Until Sun 10/2/2022 at 2359, Normal         CONTINUE these medications which have NOT CHANGED    Details   albuterol (PROVENTIL HFA,VENTOLIN HFA) 90 mcg/act inhaler Inhale 2 puffs every 4 (four) hours as needed for shortness of breath, Starting Thu 3/29/2018, Print      budesonide (ENTOCORT EC) 3 MG capsule Take 3 capsules (9 mg total) by mouth daily, Starting Thu 7/14/2022, Normal      ezetimibe (ZETIA) 10 mg tablet Take 10 mg by mouth daily, Historical Med      fexofenadine (ALLEGRA) 60 MG tablet Take by mouth as needed  , Historical Med      Humira Pen 40 MG/0 8ML PNKT INJECT 0 8 ML (40 MG TOTAL) UNDER THE SKIN ONCE A WEEK , Starting Wed 6/8/2022, Normal      pantoprazole (PROTONIX) 40 mg tablet Take 1 tablet (40 mg total) by mouth daily Before breakfast and dinner, Starting Fri 11/20/2020, Normal             No discharge procedures on file      PDMP Review     None          ED Provider  Electronically Signed by           Marsha Tan MD  09/22/22 9857

## 2022-09-22 NOTE — DISCHARGE INSTRUCTIONS
A  personal message from Dr Terri Jaffe,  Thank you so much for allowing me to care for you today  I pride myself in the care and attention I give all my patients  I hope you were a witness to this tonight  If for any reason your condition does not improve, worsens, or you have a question that was not answered during your visit you can feel free to text me on my personal phone   # [phone]  I will answer to your message and continue your care past your emergency room visit

## 2022-09-24 ENCOUNTER — HOSPITAL ENCOUNTER (EMERGENCY)
Facility: HOSPITAL | Age: 32
Discharge: HOME | End: 2022-09-24
Attending: EMERGENCY MEDICINE
Payer: COMMERCIAL

## 2022-09-24 VITALS
DIASTOLIC BLOOD PRESSURE: 75 MMHG | TEMPERATURE: 98.2 F | OXYGEN SATURATION: 99 % | HEART RATE: 60 BPM | RESPIRATION RATE: 15 BRPM | SYSTOLIC BLOOD PRESSURE: 138 MMHG

## 2022-09-24 DIAGNOSIS — L02.214 ABSCESS OF RIGHT GROIN: Primary | ICD-10-CM

## 2022-09-24 PROCEDURE — 99282 EMERGENCY DEPT VISIT SF MDM: CPT

## 2022-09-24 PROCEDURE — 99284 EMERGENCY DEPT VISIT MOD MDM: CPT | Performed by: PHYSICIAN ASSISTANT

## 2022-09-24 PROCEDURE — 10061 I&D ABSCESS COMP/MULTIPLE: CPT | Performed by: PHYSICIAN ASSISTANT

## 2022-09-24 RX ORDER — LIDOCAINE HYDROCHLORIDE AND EPINEPHRINE 10; 10 MG/ML; UG/ML
5 INJECTION, SOLUTION INFILTRATION; PERINEURAL ONCE
Status: DISCONTINUED | OUTPATIENT
Start: 2022-09-24 | End: 2022-09-24 | Stop reason: HOSPADM

## 2022-09-24 RX ORDER — CEPHALEXIN 500 MG/1
500 CAPSULE ORAL EVERY 6 HOURS SCHEDULED
Qty: 27 CAPSULE | Refills: 0 | Status: SHIPPED | OUTPATIENT
Start: 2022-09-24 | End: 2022-10-01

## 2022-09-24 RX ORDER — CEPHALEXIN 250 MG/1
500 CAPSULE ORAL ONCE
Status: COMPLETED | OUTPATIENT
Start: 2022-09-24 | End: 2022-09-24

## 2022-09-24 RX ADMIN — CEPHALEXIN 500 MG: 250 CAPSULE ORAL at 07:58

## 2022-09-24 NOTE — ED PROVIDER NOTES
History  Chief Complaint   Patient presents with    Abscess     Pt was seen on Thursday for an abscess in his groin, was given IV antibiotics and told to return if not improved, pt states abscess is larger in size and painful     29yo male with a history of Crohn's disease on Humira and hyperlipidemia presenting for evaluation of right groin pain x 3 days  He reports redness, swelling, and pain in his right groin  He was seen in the ED 2 days ago for the same and was diagnosed with an abscess  He received a dose of IV Rocephin and was told to come back if there was no improvement in 48 hours  Patient states the the area of swelling is now larger and more painful  Keflex is on his discharge medication list from 2 days ago but he states he never received a prescription for this  Patient denies any fevers, chills, or other systemic symptoms  He is urinating normally  No prior history of MRSA  History provided by:  Patient and medical records   used: No    Abscess  Location:  Pelvis  Pelvic abscess location:  Groin  Size:  4cm x 4cm   Abscess quality: induration, painful, redness and warmth    Abscess quality: not draining and no fluctuance    Duration:  3 days  Progression:  Worsening  Pain details:     Quality:  Pressure    Severity:  Moderate    Timing:  Constant    Progression:  Worsening  Chronicity:  New  Context: not diabetes    Relieved by:  Nothing  Exacerbated by: Contact  Associated symptoms: no fatigue, no fever, no nausea and no vomiting    Risk factors: no hx of MRSA        Prior to Admission Medications   Prescriptions Last Dose Informant Patient Reported? Taking? HYDROcodone-acetaminophen (Norco) 5-325 mg per tablet   No No   Sig: Take 1 tablet by mouth every 6 (six) hours as needed for pain for up to 10 days Max Daily Amount: 4 tablets   Humira Pen 40 MG/0 8ML PNKT   No No   Sig: INJECT 0 8 ML (40 MG TOTAL) UNDER THE SKIN ONCE A WEEK     albuterol (PROVENTIL HFA,VENTOLIN HFA) 90 mcg/act inhaler  Self No No   Sig: Inhale 2 puffs every 4 (four) hours as needed for shortness of breath   budesonide (ENTOCORT EC) 3 MG capsule   No No   Sig: Take 3 capsules (9 mg total) by mouth daily   cephalexin (KEFLEX) 500 mg capsule   No No   Sig: Take 1 capsule (500 mg total) by mouth every 6 (six) hours for 7 days   ezetimibe (ZETIA) 10 mg tablet  Self Yes No   Sig: Take 10 mg by mouth daily   fexofenadine (ALLEGRA) 60 MG tablet  Self Yes No   Sig: Take by mouth as needed     pantoprazole (PROTONIX) 40 mg tablet  Self No No   Sig: Take 1 tablet (40 mg total) by mouth daily Before breakfast and dinner      Facility-Administered Medications: None       Past Medical History:   Diagnosis Date    GERD (gastroesophageal reflux disease)     H/O angina pectoris     Hyperlipidemia     Sleep apnea        Past Surgical History:   Procedure Laterality Date    CARPAL TUNNEL RELEASE      CHOLECYSTECTOMY      HERNIA REPAIR      [place] ESOPHAGOGASTRODUODENOSCOPY TRANSORAL DIAGNOSTIC N/A 4/20/2017    Procedure: EGD AND COLONOSCOPY;  Surgeon: Lynn Li MD;  Location: [facility]; Service: Gastroenterology    SHOULDER SURGERY Right        Family History   Problem Relation Age of Onset    Diabetes Mother      I have reviewed and agree with the history as documented  E-Cigarette/Vaping    E-Cigarette Use Never User      E-Cigarette/Vaping Substances     Social History     Tobacco Use    Smoking status: Never Smoker    Smokeless tobacco: Never Used   Vaping Use    Vaping Use: Never used   Substance Use Topics    Alcohol use: No    Drug use: No       Review of Systems   Constitutional: Negative for chills, fatigue and fever  HENT: Negative for drooling and ear discharge  Eyes: Negative for discharge and redness  Gastrointestinal: Negative for nausea and vomiting  Musculoskeletal: Negative for neck pain and neck stiffness  Skin: Positive for wound  Negative for pallor     Neurological: Negative for seizures and weakness  Psychiatric/Behavioral: Negative for confusion  The patient is not nervous/anxious  All other systems reviewed and are negative  Physical Exam  Physical Exam  Vitals and nursing note reviewed  Constitutional:       General: He is not in acute distress  Appearance: Normal appearance  He is not toxic-appearing  HENT:      Head: Normocephalic and atraumatic  Right Ear: External ear normal       Left Ear: External ear normal    Eyes:      General: No scleral icterus  Right eye: No discharge  Left eye: No discharge  Conjunctiva/sclera: Conjunctivae normal    Cardiovascular:      Rate and Rhythm: Normal rate  Pulmonary:      Effort: Pulmonary effort is normal  No respiratory distress  Breath sounds: No stridor  Genitourinary:      Musculoskeletal:         General: No deformity  Normal range of motion  Cervical back: Normal range of motion  Skin:     General: Skin is warm and dry  Neurological:      General: No focal deficit present  Mental Status: He is alert  Mental status is at baseline     Psychiatric:         Mood and Affect: Mood normal          Behavior: Behavior normal          Vital Signs  ED Triage Vitals [09/24/22 0645]   Temperature Pulse Respirations Blood Pressure SpO2   98 2 °F (36 8 °C) 60 15 138/75 99 %      Temp Source Heart Rate Source Patient Position - Orthostatic VS BP Location FiO2 (%)   Oral Monitor Sitting Right arm --      Pain Score       8           Vitals:    09/24/22 0645   BP: 138/75   Pulse: 60   Patient Position - Orthostatic VS: Sitting         Visual Acuity      ED Medications  Medications   cephalexin (KEFLEX) capsule 500 mg (500 mg Oral Given 9/24/22 0758)       Diagnostic Studies  Results Reviewed     None                 No orders to display              Procedures  Incision and drain    Date/Time: 9/24/2022 7:27 AM  Performed by: Latosha Liu PA-C  Authorized by: Jamir Starkey RIVERA Schuster   Universal Protocol:  Consent: Verbal consent obtained  Risks and benefits: risks, benefits and alternatives were discussed  Consent given by: patient  Patient understanding: patient states understanding of the procedure being performed  Patient identity confirmed: verbally with patient      Patient location:  ED  Location:     Type:  Abscess    Size:  4cm x 4cm    Location:  Anogenital    Anogenital location: R groin  Pre-procedure details:     Skin preparation:  Betadine  Anesthesia (see MAR for exact dosages): Anesthesia method:  Local infiltration    Local anesthetic:  Lidocaine 1% WITH epi  Procedure details:     Complexity:  Simple    Incision types:  Stab incision    Scalpel blade:  11    Approach:  Open    Wound management:  Probed and deloculated    Drainage:  Bloody    Drainage amount: Moderate    Wound treatment:  Wound left open    Packing materials:  None  Post-procedure details:     Patient tolerance of procedure: Tolerated well, no immediate complications             ED Course                   MDM  Number of Diagnoses or Management Options  Abscess of right groin: new and does not require workup  Diagnosis management comments: 31yo male presenting for an abscess of his right groin/inguinal region x 3 days  Seen in the ED 2 days ago and received a dose of IV Rocephin  He is presenting with worsening symptoms  No f/c or systemic symptoms  He is afebrile and hemodynamically stable  Patient is clinically well appearing in no acute distress  On exam, he has a 4cm x 4cm area of induration, redness, and warmth to the right groin  No fluctuance palpated  Discussed with patient that given lack of fluctuance, I&D unlikely to be beneficial at this time but patient is requesting I&D to be attempted  I&D performed at bedside with moderate amount of bloody output  No fluctuance expressed  He was started on a course of Keflex  Discussed importance of warm compresses   He was given a referral to general surgery for f/u  Advised return to the ED with any worsening symptoms, fevers, chills  Patient expressed understanding and is agreeable to plan  Patient discharged in stable condition  Amount and/or Complexity of Data Reviewed  Review and summarize past medical records: yes    Risk of Complications, Morbidity, and/or Mortality  Presenting problems: moderate  Diagnostic procedures: low  Management options: low    Patient Progress  Patient progress: stable      Disposition  Final diagnoses:   Abscess of right groin     Time reflects when diagnosis was documented in both MDM as applicable and the Disposition within this note     Time User Action Codes Description Comment    9/24/2022  7:34  [facility]L02 [id number][facility] Abscess of right groin       ED Disposition     ED Disposition   Discharge    Condition   Stable    Date/Time   Sat Sep 24, 2022  7:34 AM    Comment   Carlos Arrington discharge to home/self care  Follow-up Information     Follow up With Specialties Details Why Contact Info Additional [address] [facility] DO Yahir General Surgery, Osteopathic Medicine Schedule an appointment as soon as possible for a visit   [facility] [place] [facility] [phone]       [facility] Emergency Department Emergency Medicine  If symptoms worsen [address] Emergency Department, [address] [place] [address], [facility]          Discharge Medication List as of 9/24/2022  7:35 AM      START taking these medications    Details   !! cephalexin (KEFLEX) 500 mg capsule Take 1 capsule (500 mg total) by mouth every 6 (six) hours for 7 days, Starting Sat 9/24/2022, Until Sat 10/1/2022, Normal       !! - Potential duplicate medications found  Please discuss with provider        CONTINUE these medications which have NOT CHANGED    Details   albuterol (PROVENTIL HFA,VENTOLIN HFA) 90 mcg/act inhaler Inhale 2 puffs every 4 (four) hours as needed for shortness of breath, Starting Thu 3/29/2018, Print      budesonide (ENTOCORT EC) 3 MG capsule Take 3 capsules (9 mg total) by mouth daily, Starting Thu 7/14/2022, Normal      !! cephalexin (KEFLEX) 500 mg capsule Take 1 capsule (500 mg total) by mouth every 6 (six) hours for 7 days, Starting Thu 9/22/2022, Until Thu 9/29/2022, Normal      ezetimibe (ZETIA) 10 mg tablet Take 10 mg by mouth daily, Historical Med      fexofenadine (ALLEGRA) 60 MG tablet Take by mouth as needed  , Historical Med      Humira Pen 40 MG/0 8ML PNKT INJECT 0 8 ML (40 MG TOTAL) UNDER THE SKIN ONCE A WEEK , Starting Wed 6/8/2022, Normal      HYDROcodone-acetaminophen (Norco) 5-325 mg per tablet Take 1 tablet by mouth every 6 (six) hours as needed for pain for up to 10 days Max Daily Amount: 4 tablets, Starting Thu 9/22/2022, Until Sun 10/2/2022 at 2359, Normal      pantoprazole (PROTONIX) 40 mg tablet Take 1 tablet (40 mg total) by mouth daily Before breakfast and dinner, Starting Fri 11/20/2020, Normal       !! - Potential duplicate medications found  Please discuss with provider  No discharge procedures on file      PDMP Review     None          ED Provider  Electronically Signed by           Javier Bates PA-C  09/24/22 7495

## 2022-09-24 NOTE — DISCHARGE INSTRUCTIONS
Take antibiotics as prescribed  Apply warm compresses 4-6x daily  Please follow-up with general surgery  Return to the ER with any worsening symptoms, fevers, severe pain

## 2022-09-29 ENCOUNTER — ANESTHESIA EVENT (OUTPATIENT)
Dept: PERIOP | Facility: HOSPITAL | Age: 32
End: 2022-09-29
Payer: COMMERCIAL

## 2022-09-29 ENCOUNTER — TELEPHONE (OUTPATIENT)
Dept: SURGERY | Facility: CLINIC | Age: 32
End: 2022-09-29

## 2022-09-29 ENCOUNTER — OFFICE VISIT (OUTPATIENT)
Dept: SURGERY | Facility: CLINIC | Age: 32
End: 2022-09-29
Payer: COMMERCIAL

## 2022-09-29 ENCOUNTER — TELEPHONE (OUTPATIENT)
Dept: GASTROENTEROLOGY | Facility: CLINIC | Age: 32
End: 2022-09-29

## 2022-09-29 VITALS
DIASTOLIC BLOOD PRESSURE: 84 MMHG | RESPIRATION RATE: 16 BRPM | WEIGHT: 200 LBS | HEIGHT: 62 IN | HEART RATE: 83 BPM | BODY MASS INDEX: 36.8 KG/M2 | OXYGEN SATURATION: 97 % | SYSTOLIC BLOOD PRESSURE: 124 MMHG | TEMPERATURE: 98 F

## 2022-09-29 DIAGNOSIS — L02.214 ABSCESS OF RIGHT GROIN: Primary | ICD-10-CM

## 2022-09-29 PROCEDURE — 99213 OFFICE O/P EST LOW 20 MIN: CPT | Performed by: SURGERY

## 2022-09-29 RX ORDER — SODIUM CHLORIDE, SODIUM LACTATE, POTASSIUM CHLORIDE, CALCIUM CHLORIDE 600; 310; 30; 20 MG/100ML; MG/100ML; MG/100ML; MG/100ML
125 INJECTION, SOLUTION INTRAVENOUS
Status: CANCELLED | OUTPATIENT
Start: 2022-09-29 | End: 2022-09-30

## 2022-09-29 NOTE — PROGRESS NOTES
Follow Up - General Surgery   Isaak Acosta 34 y o  male MRN: [id number]  Unit/Bed#:  Encounter: [id number]    Assessment/Plan     Assessment:  Right groin abscess  History of Crohn's disease on Humira  History of GERD  History of hyperlipidemia  History of sleep apnea  Morbid obesity  Plan:  Patient has persistent abscess from the right groin  I advised the patient to undergo incision and drainage and open wound packing tomorrow in the operating room under IV sedation  I discussed the operative procedure, risks, benefits, alternatives and possible complications, he understood and agreed to proceed  History of Present Illness     HPI:  Isaak Acosta is a 34 y o  male who presents to my office for evaluation of right groin abscess  The patient was seen in the emergency room this past weekend because of painful red lump on the right groin that was present for a couple days  Patient had an attempted I and D in the emergency room and then follow-up couple days later  Patient noted that the lump has not decreased in size, the redness still present, he denies having any fever, chills or any other constitutional symptoms  The patient has been taking oral antibiotics and warm compresses, no drainage from the abscess  Review of Systems: The rest of the review of system total of 10 were negative except for the HPI  Historical Information   Past Medical History:   Diagnosis Date    GERD (gastroesophageal reflux disease)     H/O angina pectoris     Hyperlipidemia     Sleep apnea      Past Surgical History:   Procedure Laterality Date    CARPAL TUNNEL RELEASE      CHOLECYSTECTOMY      HERNIA REPAIR      [place] ESOPHAGOGASTRODUODENOSCOPY TRANSORAL DIAGNOSTIC N/A 4/20/2017    Procedure: EGD AND COLONOSCOPY;  Surgeon: Lynn Li MD;  Location: [facility];   Service: Gastroenterology    SHOULDER SURGERY Right      Social History   Social History     Substance and Sexual Activity   Alcohol Use No     Social History     Substance and Sexual Activity   Drug Use No     Social History     Tobacco Use   Smoking Status Never Smoker   Smokeless Tobacco Never Used     Family History: non-contributory    Meds/Allergies   all medications and allergies reviewed     Current Outpatient Medications:     cephalexin (KEFLEX) 500 mg capsule, Take 1 capsule (500 mg total) by mouth every 6 (six) hours for 7 days, Disp: 28 capsule, Rfl: 0    ezetimibe (ZETIA) 10 mg tablet, Take 10 mg by mouth daily, Disp: , Rfl:     fexofenadine (ALLEGRA) 60 MG tablet, Take by mouth as needed  , Disp: , Rfl:     Humira Pen 40 MG/0 8ML PNKT, INJECT 0 8 ML (40 MG TOTAL) UNDER THE SKIN ONCE A WEEK , Disp: 4 each, Rfl: 8    HYDROcodone-acetaminophen (Norco) 5-325 mg per tablet, Take 1 tablet by mouth every 6 (six) hours as needed for pain for up to 10 days Max Daily Amount: 4 tablets, Disp: 15 tablet, Rfl: 0    pantoprazole (PROTONIX) 40 mg tablet, Take 1 tablet (40 mg total) by mouth daily Before breakfast and dinner, Disp: 60 tablet, Rfl: 1    albuterol (PROVENTIL HFA,VENTOLIN HFA) 90 mcg/act inhaler, Inhale 2 puffs every 4 (four) hours as needed for shortness of breath, Disp: 1 Inhaler, Rfl: 0    budesonide (ENTOCORT EC) 3 MG capsule, Take 3 capsules (9 mg total) by mouth daily, Disp: 30 capsule, Rfl: 3    cephalexin (KEFLEX) 500 mg capsule, Take 1 capsule (500 mg total) by mouth every 6 (six) hours for 7 days, Disp: 27 capsule, Rfl: 0  Allergies   Allergen Reactions    Aluminum     Bee Venom Swelling     Swelling (facial)    Ciprofloxacin     Clindamycin GI Intolerance     GI issues    Doxycycline GI Intolerance     GI issues    Oxycodone-Acetaminophen Itching     Itchy    Percocet [Oxycodone-Acetaminophen]        Objective     Current Vitals:   Blood Pressure: 124/84 (09/29/22 1058)  Pulse: 83 (09/29/22 1058)  Temperature: 98 °F (36 7 °C) (09/29/22 1058)  Temp Source: Temporal (09/29/22 1058)  Respirations: 16 (09/29/22 1058)  Height: 5' 2" (157 5 cm) (09/29/22 1058)  Weight - Scale: 90 7 kg (200 lb) (09/29/22 1058)  SpO2: 97 % (09/29/22 1058)    Physical Exam  Vitals and nursing note reviewed  Constitutional:       General: He is not in acute distress  Appearance: He is obese  Cardiovascular:      Rate and Rhythm: Normal rate and regular rhythm  Heart sounds: No murmur heard  Pulmonary:      Effort: No respiratory distress  Breath sounds: Normal breath sounds  Abdominal:      Palpations: Abdomen is soft  There is no mass  Tenderness: There is no abdominal tenderness  Skin:     General: Skin is warm  Coloration: Skin is not jaundiced  Findings: No erythema or rash  Comments: There is a 5 cm abscess on the right groin, no drainage, positive erythema and increased temperature to touch  Neurological:      Mental Status: He is alert and oriented to person, place, and time  Cranial Nerves: No cranial nerve deficit     Psychiatric:         Mood and Affect: Mood normal          Behavior: Behavior normal

## 2022-09-29 NOTE — H&P (VIEW-ONLY)
Follow Up - General Surgery   Flor Dos Santos 34 y o  male MRN: [id number]  Unit/Bed#:  Encounter: [id number]    Assessment/Plan     Assessment:  Right groin abscess  History of Crohn's disease on Humira  History of GERD  History of hyperlipidemia  History of sleep apnea  Morbid obesity  Plan:  Patient has persistent abscess from the right groin  I advised the patient to undergo incision and drainage and open wound packing tomorrow in the operating room under IV sedation  I discussed the operative procedure, risks, benefits, alternatives and possible complications, he understood and agreed to proceed  History of Present Illness     HPI:  Flor Dos Santos is a 34 y o  male who presents to my office for evaluation of right groin abscess  The patient was seen in the emergency room this past weekend because of painful red lump on the right groin that was present for a couple days  Patient had an attempted I and D in the emergency room and then follow-up couple days later  Patient noted that the lump has not decreased in size, the redness still present, he denies having any fever, chills or any other constitutional symptoms  The patient has been taking oral antibiotics and warm compresses, no drainage from the abscess  Review of Systems: The rest of the review of system total of 10 were negative except for the HPI  Historical Information   Past Medical History:   Diagnosis Date    GERD (gastroesophageal reflux disease)     H/O angina pectoris     Hyperlipidemia     Sleep apnea      Past Surgical History:   Procedure Laterality Date    CARPAL TUNNEL RELEASE      CHOLECYSTECTOMY      HERNIA REPAIR      [place] ESOPHAGOGASTRODUODENOSCOPY TRANSORAL DIAGNOSTIC N/A 4/20/2017    Procedure: EGD AND COLONOSCOPY;  Surgeon: John Sherwood MD;  Location: [facility];   Service: Gastroenterology    SHOULDER SURGERY Right      Social History   Social History     Substance and Sexual Activity   Alcohol Use No     Social History     Substance and Sexual Activity   Drug Use No     Social History     Tobacco Use   Smoking Status Never Smoker   Smokeless Tobacco Never Used     Family History: non-contributory    Meds/Allergies   all medications and allergies reviewed     Current Outpatient Medications:     cephalexin (KEFLEX) 500 mg capsule, Take 1 capsule (500 mg total) by mouth every 6 (six) hours for 7 days, Disp: 28 capsule, Rfl: 0    ezetimibe (ZETIA) 10 mg tablet, Take 10 mg by mouth daily, Disp: , Rfl:     fexofenadine (ALLEGRA) 60 MG tablet, Take by mouth as needed  , Disp: , Rfl:     Humira Pen 40 MG/0 8ML PNKT, INJECT 0 8 ML (40 MG TOTAL) UNDER THE SKIN ONCE A WEEK , Disp: 4 each, Rfl: 8    HYDROcodone-acetaminophen (Norco) 5-325 mg per tablet, Take 1 tablet by mouth every 6 (six) hours as needed for pain for up to 10 days Max Daily Amount: 4 tablets, Disp: 15 tablet, Rfl: 0    pantoprazole (PROTONIX) 40 mg tablet, Take 1 tablet (40 mg total) by mouth daily Before breakfast and dinner, Disp: 60 tablet, Rfl: 1    albuterol (PROVENTIL HFA,VENTOLIN HFA) 90 mcg/act inhaler, Inhale 2 puffs every 4 (four) hours as needed for shortness of breath, Disp: 1 Inhaler, Rfl: 0    budesonide (ENTOCORT EC) 3 MG capsule, Take 3 capsules (9 mg total) by mouth daily, Disp: 30 capsule, Rfl: 3    cephalexin (KEFLEX) 500 mg capsule, Take 1 capsule (500 mg total) by mouth every 6 (six) hours for 7 days, Disp: 27 capsule, Rfl: 0  Allergies   Allergen Reactions    Aluminum     Bee Venom Swelling     Swelling (facial)    Ciprofloxacin     Clindamycin GI Intolerance     GI issues    Doxycycline GI Intolerance     GI issues    Oxycodone-Acetaminophen Itching     Itchy    Percocet [Oxycodone-Acetaminophen]        Objective     Current Vitals:   Blood Pressure: 124/84 (09/29/22 1058)  Pulse: 83 (09/29/22 1058)  Temperature: 98 °F (36 7 °C) (09/29/22 1058)  Temp Source: Temporal (09/29/22 1058)  Respirations: 16 (09/29/22 1058)  Height: 5' 2" (157 5 cm) (09/29/22 1058)  Weight - Scale: 90 7 kg (200 lb) (09/29/22 1058)  SpO2: 97 % (09/29/22 1058)    Physical Exam  Vitals and nursing note reviewed  Constitutional:       General: He is not in acute distress  Appearance: He is obese  Cardiovascular:      Rate and Rhythm: Normal rate and regular rhythm  Heart sounds: No murmur heard  Pulmonary:      Effort: No respiratory distress  Breath sounds: Normal breath sounds  Abdominal:      Palpations: Abdomen is soft  There is no mass  Tenderness: There is no abdominal tenderness  Skin:     General: Skin is warm  Coloration: Skin is not jaundiced  Findings: No erythema or rash  Comments: There is a 5 cm abscess on the right groin, no drainage, positive erythema and increased temperature to touch  Neurological:      Mental Status: He is alert and oriented to person, place, and time  Cranial Nerves: No cranial nerve deficit     Psychiatric:         Mood and Affect: Mood normal          Behavior: Behavior normal

## 2022-09-29 NOTE — PRE-PROCEDURE INSTRUCTIONS
Pre-Surgery Instructions:   Medication Instructions    albuterol (PROVENTIL HFA,VENTOLIN HFA) 90 mcg/act inhaler Uses PRN- OK to take day of surgery    cephalexin (KEFLEX) 500 mg capsule Hold day of surgery   ezetimibe (ZETIA) 10 mg tablet Take day of surgery   fexofenadine (ALLEGRA) 60 MG tablet Uses PRN- OK to take day of surgery    Humira Pen 40 MG/0 8ML PNKT Takes on Mondays    pantoprazole (PROTONIX) 40 mg tablet Take day of surgery  Pre op instructions per My Surgical Experience booklet,medications per anesthesia guidelines and showering instructions per [facility] protocol reviewed-Patient has CHG  Pt  Verbalized understanding of current visitor restrictions  Aware he should wear a mask DOS  Instructed to call surgeon with any illness or covid exposure now till DOS  All medications instructed to take DOS are with sips water only  Instructed to avoid all ASA/NSAIDs and OTC Vit/Supp from now until after surgery per anesthesia guidelines  Tylenol ok prn  Pt  Verbalized an understanding of all instructions reviewed and offers no concerns at this time

## 2022-09-29 NOTE — TELEPHONE ENCOUNTER
Patients current authorization for Humira 40mg weekly expires on 10/29/2022  Will submit for new authorization

## 2022-09-30 ENCOUNTER — HOSPITAL ENCOUNTER (OUTPATIENT)
Facility: HOSPITAL | Age: 32
Setting detail: OUTPATIENT SURGERY
Discharge: HOME | End: 2022-09-30
Attending: SURGERY | Admitting: SURGERY
Payer: COMMERCIAL

## 2022-09-30 ENCOUNTER — ANESTHESIA (OUTPATIENT)
Dept: PERIOP | Facility: HOSPITAL | Age: 32
End: 2022-09-30
Payer: COMMERCIAL

## 2022-09-30 VITALS
BODY MASS INDEX: 37.04 KG/M2 | WEIGHT: 201.28 LBS | SYSTOLIC BLOOD PRESSURE: 128 MMHG | TEMPERATURE: 98.7 F | HEART RATE: 76 BPM | DIASTOLIC BLOOD PRESSURE: 78 MMHG | OXYGEN SATURATION: 98 % | RESPIRATION RATE: 16 BRPM | HEIGHT: 62 IN

## 2022-09-30 DIAGNOSIS — L02.214 ABSCESS OF RIGHT GROIN: ICD-10-CM

## 2022-09-30 PROBLEM — G47.33 OSA (OBSTRUCTIVE SLEEP APNEA): Status: ACTIVE | Noted: 2022-09-30

## 2022-09-30 PROBLEM — E78.5 HLD (HYPERLIPIDEMIA): Status: ACTIVE | Noted: 2022-09-30

## 2022-09-30 PROCEDURE — 10061 I&D ABSCESS COMP/MULTIPLE: CPT

## 2022-09-30 PROCEDURE — 10061 I&D ABSCESS COMP/MULTIPLE: CPT | Performed by: SURGERY

## 2022-09-30 RX ORDER — SODIUM CHLORIDE, SODIUM LACTATE, POTASSIUM CHLORIDE, CALCIUM CHLORIDE 600; 310; 30; 20 MG/100ML; MG/100ML; MG/100ML; MG/100ML
125 INJECTION, SOLUTION INTRAVENOUS
Status: COMPLETED | OUTPATIENT
Start: 2022-09-30 | End: 2022-09-30

## 2022-09-30 RX ORDER — PROPOFOL 10 MG/ML
INJECTION, EMULSION INTRAVENOUS AS NEEDED
Status: DISCONTINUED | OUTPATIENT
Start: 2022-09-30 | End: 2022-09-30

## 2022-09-30 RX ORDER — MAGNESIUM HYDROXIDE 1200 MG/15ML
LIQUID ORAL AS NEEDED
Status: DISCONTINUED | OUTPATIENT
Start: 2022-09-30 | End: 2022-09-30 | Stop reason: HOSPADM

## 2022-09-30 RX ORDER — LIDOCAINE HYDROCHLORIDE 10 MG/ML
INJECTION, SOLUTION EPIDURAL; INFILTRATION; INTRACAUDAL; PERINEURAL AS NEEDED
Status: DISCONTINUED | OUTPATIENT
Start: 2022-09-30 | End: 2022-09-30

## 2022-09-30 RX ORDER — FENTANYL CITRATE 50 UG/ML
INJECTION, SOLUTION INTRAMUSCULAR; INTRAVENOUS AS NEEDED
Status: DISCONTINUED | OUTPATIENT
Start: 2022-09-30 | End: 2022-09-30

## 2022-09-30 RX ORDER — LIDOCAINE HYDROCHLORIDE 10 MG/ML
INJECTION, SOLUTION EPIDURAL; INFILTRATION; INTRACAUDAL; PERINEURAL AS NEEDED
Status: DISCONTINUED | OUTPATIENT
Start: 2022-09-30 | End: 2022-09-30 | Stop reason: HOSPADM

## 2022-09-30 RX ORDER — ONDANSETRON 2 MG/ML
4 INJECTION INTRAMUSCULAR; INTRAVENOUS EVERY 8 HOURS PRN
Status: CANCELLED | OUTPATIENT
Start: 2022-09-30

## 2022-09-30 RX ORDER — SODIUM CHLORIDE, SODIUM LACTATE, POTASSIUM CHLORIDE, CALCIUM CHLORIDE 600; 310; 30; 20 MG/100ML; MG/100ML; MG/100ML; MG/100ML
125 INJECTION, SOLUTION INTRAVENOUS CONTINUOUS
Status: DISCONTINUED | OUTPATIENT
Start: 2022-09-30 | End: 2022-09-30 | Stop reason: HOSPADM

## 2022-09-30 RX ORDER — TRAMADOL HYDROCHLORIDE 50 MG/1
50 TABLET ORAL EVERY 6 HOURS PRN
Qty: 10 TABLET | Refills: 0 | Status: SHIPPED | OUTPATIENT
Start: 2022-09-30 | End: 2022-10-10

## 2022-09-30 RX ORDER — MIDAZOLAM HYDROCHLORIDE 2 MG/2ML
INJECTION, SOLUTION INTRAMUSCULAR; INTRAVENOUS AS NEEDED
Status: DISCONTINUED | OUTPATIENT
Start: 2022-09-30 | End: 2022-09-30

## 2022-09-30 RX ORDER — CEFAZOLIN SODIUM 2 G/50ML
2000 SOLUTION INTRAVENOUS ONCE
Status: COMPLETED | OUTPATIENT
Start: 2022-09-30 | End: 2022-09-30

## 2022-09-30 RX ORDER — TRAMADOL HYDROCHLORIDE 50 MG/1
50 TABLET ORAL EVERY 6 HOURS PRN
Status: CANCELLED | OUTPATIENT
Start: 2022-09-30

## 2022-09-30 RX ORDER — FENTANYL CITRATE/PF 50 MCG/ML
25 SYRINGE (ML) INJECTION
Status: CANCELLED | OUTPATIENT
Start: 2022-09-30

## 2022-09-30 RX ADMIN — CEFAZOLIN SODIUM 2000 MG: 2 SOLUTION INTRAVENOUS at 08:26

## 2022-09-30 RX ADMIN — FENTANYL CITRATE 100 MCG: 50 INJECTION, SOLUTION INTRAMUSCULAR; INTRAVENOUS at 08:30

## 2022-09-30 RX ADMIN — MIDAZOLAM HYDROCHLORIDE 2 MG: 1 INJECTION, SOLUTION INTRAMUSCULAR; INTRAVENOUS at 08:25

## 2022-09-30 RX ADMIN — LIDOCAINE HYDROCHLORIDE 50 MG: 10 INJECTION, SOLUTION EPIDURAL; INFILTRATION; INTRACAUDAL; PERINEURAL at 08:36

## 2022-09-30 RX ADMIN — SODIUM CHLORIDE, SODIUM LACTATE, POTASSIUM CHLORIDE, AND CALCIUM CHLORIDE 125 ML/HR: .6; .31; .03; .02 INJECTION, SOLUTION INTRAVENOUS at 08:18

## 2022-09-30 RX ADMIN — PROPOFOL 60 MG: 10 INJECTION, EMULSION INTRAVENOUS at 08:36

## 2022-09-30 RX ADMIN — PROPOFOL 30 MG: 10 INJECTION, EMULSION INTRAVENOUS at 08:40

## 2022-09-30 NOTE — ANESTHESIA PREPROCEDURE EVALUATION
Procedure:  INCISION AND DRAINAGE (I&D) RIGHT GROIN ABSCESS (Right Groin)    Relevant Problems   CARDIO   (+) HLD (hyperlipidemia)      GI/HEPATIC   (+) Fatty liver   (+) GERD without esophagitis      PULMONARY   (+) MAYO (obstructive sleep apnea)      Digestive   (+) Crohn's disease of ileum, other complication (HCC)      Other   (+) Abscess of right groin   (+) Class 3 severe obesity in adult [facility])      This is a 34 y o  male who presents for INCISION AND DRAINAGE (I&D) RIGHT GROIN ABSCESS (Right Groin)  -- VITALS --  Ht 5' 2" (1 575 m)   Wt 90 7 kg (200 lb)   BMI 36 58 kg/m²   BP Readings from Last 3 Encounters:   09/29/22 124/84   09/24/22 138/75   09/22/22 151/92     -- LABS --  Results from Last 12 Months   Lab Units 09/22/22  1258 01/13/22  1300   SODIUM mmol/L 140 144   POTASSIUM mmol/L 4 4 3 7   CHLORIDE mmol/L 102 104   CO2 mmol/L 30 29   ANION GAP mmol/L 8 11   BUN mg/dL 15 20   CREATININE mg/dL 0 91 0 97   CALCIUM mg/dL 9 3 8 8   GLUCOSE RANDOM mg/dL 122  --    AST U/L 23 21   ALT U/L 48 52   ALK PHOS U/L 67 51   TOTAL BILIRUBIN mg/dL 0 43 0 48   ALBUMIN g/dL 3 9 3 8     Results from Last 12 Months   Lab Units 09/22/22  1258 01/13/22  1300   WBC Thousand/uL 12 83* 8 86   HEMOGLOBIN g/dL 15 2 14 5   HEMATOCRIT % 45 5 42 8   PLATELETS Thousands/uL 290 340     No results for input(s): APTT, INR, PTT in the last 8784 hours  -- ANESTHESIA RISK ASSESSMENT / QUESTIONNAIRE --   (1) Relevant airway history: n/a     (2) EKG  Sinus tachycardia     No previous ECGs available  Confirmed by MD Jayne, Lyndsay ([id number]) on 3/29/2018 6:34:09 PM  No results found for this or any previous visit       (3) Echo/Relevant Imaging  n/a     (4) Patient took the following medications this morning: none     (5) Personal or family history of anesthesia related complications: "difficult to sedate"     (6) Patient meets ASA NPO guidelines: y     (7) Cardiac assessment       (a) Does the patient have severe, modifiable cardiac conditions including Si/Sx of MI (w/i 14 dys of angioplasty, 1 mo after BMS, 2 mo after no intervention, 3-6 mo after MILTON), decompensated CHF, decompensated valvular Dz, unstable arrhythmias, or unstable pulmonary HTN?: n      (b) Calculate Gonzalez's RCRI (Surgical risk, ICM, CHF, Cr >2, CVA/TIA, IDDM): 0      (c) Quote MACE risk based on RCRI: 0= 0 4%; 1= 0 6%; 2= 7%; 3= 11%      (d) If RCRI >1, is exercise tolerance METs >4?: yes      (e) If RCRI >1 and METS <4 (or indeterminate), then is stress testing appropriate?: n/a    -- ANESTHESIA SHARED DECISION MAKING --  Benefits discussed (Rai Randall 81 [id number], PMID [id number]): Amnesia, Analgesia, Specialized anesthesiology support reduces mortality for major surgery by about 100-fold  Mortality risks associated with anesthesia discussed (PMID [id number]): ASA-PS I 1:250,000; ASA-PS II 1:20,000; ASA-PS III 1:3,500; ASA-PS IV 1:1,800  Risks by organ systems discussed included were but not limited to (PMID [id number]):  (1) Drug reactions / Allergic reactions / Overdose adverse events  (2) Neuro adverse events: IntraOp awareness, Stroke, POCD  Risk of bleeding or infection associated with neuraxial anesthesia if relevant  Risks of somnolence and discussed not driving, operating heavy machinery, or making major life-changing decisions for 24 hrs after anesthesia  (3) Pulmonary / Airway adverse events: Dental injury, Throat pain, Hypoxia, Prolonged intubation  (4) Cardiac and Vascular adverse events: PeriOp MI or other MACE  Risk of bleeding or infection related to relevant vascular access (Peripheral, Central, Arterial, or other line placement)  Risks associated with bypass circuits if relevant  (5) FEN / GI / Vomiting risks: Aspiration, PONV      Physical Exam    Airway    Mallampati score: IV  TM Distance: >3 FB  Neck ROM: full     Dental   implants,     Cardiovascular      Pulmonary      Other Findings        Anesthesia Plan  ASA Score- 3     Anesthesia Type- IV sedation with anesthesia with ASA Monitors  Additional Monitors:   Airway Plan:           Plan Factors-Exercise tolerance (METS): >4 METS  Chart reviewed  EKG reviewed  Existing labs reviewed  Patient is not a current smoker  Patient did not smoke on day of surgery  Obstructive sleep apnea risk education given perioperatively  Induction- intravenous  Postoperative Plan- Plan for postoperative opioid use  Informed Consent- Anesthetic plan and risks discussed with patient  I personally reviewed this patient with the CRNA  Discussed and agreed on the Anesthesia Plan with the CRNA  Dara Soulier

## 2022-09-30 NOTE — OP NOTE
OPERATIVE REPORT  PATIENT NAME: Marie Colón    :  1990  MRN: [id number]  Pt Location: [facility] ROOM 03    SURGERY DATE: 2022    Surgeon(s) and Role:     * Navarro Fan MD - Primary     * Jairo Castro PA-C - Assisting    Preop Diagnosis:  Abscess of right groin [L02 214]    Post-Op Diagnosis Codes:     * Abscess of right groin [L02 214]    Procedure(s) (LRB):  INCISION AND DRAINAGE (I&D) RIGHT GROIN ABSCESS (Right)    Specimen(s):  None    Estimated Blood Loss:   Minimal    Drains:  None    Anesthesia Type:   IV Sedation with Anesthesia    Operative Indications:  Abscess of right groin [L02 214]    Operative Findings:  Abscess cavity on the right groin measure approximately 5 cm, extended laterally, copious amount of purulent material, known foul-smelling  Abscess was deep in the subcutaneous tissue  The    Complications:   None    Procedure and Technique:  The patient was identified in the patient was placed in the operating table in a supine position  After adequate IV sedation the right groin was prepped and draped in sterile usual fashion with ChloraPrep  Time-out was called the patient was identified as was surgical site  1% lidocaine was infiltrated over the abscess on the right groin  An elliptical incision was made of approximately 4 cm, taken down through the subcutaneous tissue and into the abscess cavity with a scalpel dissection  The abscess cavity was probed with blunt finger dissection, wound was copiously irrigated with saline solution  Wound was packed open using 1/2 inch packing  Sterile dressing was applied  Hemostasis was accomplished with cautery  At the end of the case instrument, needles, sponges counts were correct  Patient tolerated the procedure well     I was present for the entire procedure, A qualified resident physician was not available and A physician assistant was required during the procedure for retraction tissue handling,dissection and suturing    Patient Disposition:  APU        SIGNATURE: Leandra Sandoval MD  DATE: September 30, 2022  TIME: 8:54 AM

## 2022-09-30 NOTE — ANESTHESIA POSTPROCEDURE EVALUATION
Post-Op Assessment Note    CV Status:  Stable  Pain Score: 0    Pain management: adequate     Mental Status:  Alert and awake   Hydration Status:  Euvolemic   PONV Controlled:  Controlled   Airway Patency:  Patent      Post Op Vitals Reviewed: Yes      Staff: CRNA         No complications documented      /73 (09/30/22 0856)    Temp 98 7 °F (37 1 °C) (09/30/22 0856)    Pulse 73 (09/30/22 0856)   Resp 16 (09/30/22 0856)    SpO2 100 % (09/30/22 0856)

## 2022-09-30 NOTE — DISCHARGE INSTRUCTIONS
SURGERY INSTRUCTIONS    No diet restriction for this surgery  May shower every day starting Sunday October 2nd  Remove dressings and packing in 2 days while in the shower  Shower every day after removing packing, dry wound very well and apply dry gauze, no more packing  Call office to make an appointment in 2 weeks [phone]  Call office with any issues regarding the surgery  No driving, heavy lifting or strenuous exercise for one week  Resume home medications starting today  Resume blood thinners starting tomorrow  (Aspirin, Coumadin, Eliquis, Xarelto)  Apply ice to the incisions  10 minutes every hour for 2 days  May take Tylenol 3, regular Tylenol or ibuprofen for pain  Alternating narcotics with ibuprofen or Advil will have better pain control    May take Colace for constipation

## 2022-10-14 ENCOUNTER — OFFICE VISIT (OUTPATIENT)
Dept: SURGERY | Facility: CLINIC | Age: 32
End: 2022-10-14

## 2022-10-14 VITALS
SYSTOLIC BLOOD PRESSURE: 126 MMHG | BODY MASS INDEX: 36.99 KG/M2 | DIASTOLIC BLOOD PRESSURE: 88 MMHG | WEIGHT: 201 LBS | HEART RATE: 77 BPM | HEIGHT: 62 IN

## 2022-10-14 DIAGNOSIS — Z48.89 POSTOPERATIVE VISIT: Primary | ICD-10-CM

## 2022-10-14 PROCEDURE — 99024 POSTOP FOLLOW-UP VISIT: CPT | Performed by: SURGERY

## 2022-10-14 NOTE — PROGRESS NOTES
Post-Op Follow Up- General Surgery   Sophie Liang 34 y o  male MRN: [id number]  Unit/Bed#:  Encounter: [id number]    Assessment/Plan     Assessment:  Status post incision and drainage of right groin abscess, improved  Plan:  The abscess has completely resolved, minimal drainage from the wound, no evidence of acute infection at this time  Patient is discharged from my care and I will be glad to see him if any problem arises in the future  History of Present Illness     HPI:  Sophie Liang is a 34 y o  male who presents to my office for 1st postop follow-up after incision and drainage from right groin abscess from September 30  He offers no complaints at this time  He stated that drainage decreased two days ago  He denies fever or chills  Historical Information   Past Medical History:   Diagnosis Date   • Bronchitis    • CPAP (continuous positive airway pressure) dependence    • Crohn's disease ([facility] [address] )    • GERD (gastroesophageal reflux disease)    • H/O angina pectoris    • Hyperlipidemia    • Sleep apnea      Past Surgical History:   Procedure Laterality Date   • CARPAL TUNNEL RELEASE     • CHOLECYSTECTOMY     • HERNIA REPAIR     • INCISION AND DRAINAGE OF WOUND Right 9/30/2022    Procedure: INCISION AND DRAINAGE (I&D) RIGHT GROIN ABSCESS;  Surgeon: Frida Taveras MD;  Location: [facility];  Service: General   • [place] ESOPHAGOGASTRODUODENOSCOPY TRANSORAL DIAGNOSTIC N/A 4/20/2017    Procedure: EGD AND COLONOSCOPY;  Surgeon: Barnie Gosselin, MD;  Location: [facility];   Service: Gastroenterology   • SHOULDER SURGERY Right      Social History   Social History     Substance and Sexual Activity   Alcohol Use No     Social History     Substance and Sexual Activity   Drug Use Never     Social History     Tobacco Use   Smoking Status Never Smoker   Smokeless Tobacco Never Used     Family History: non-contributory    Meds/Allergies   all medications and allergies reviewed     Current Outpatient Medications:   • ezetimibe (ZETIA) 10 mg tablet, Take 10 mg by mouth daily in the early morning, Disp: , Rfl:   •  fexofenadine (ALLEGRA) 60 MG tablet, Take 60 mg by mouth as needed, Disp: , Rfl:   •  Humira Pen 40 MG/0 8ML PNKT, INJECT 0 8 ML (40 MG TOTAL) UNDER THE SKIN ONCE A WEEK  (Patient taking differently: Inject 40 mg under the skin once a week Takes on Monday), Disp: 4 each, Rfl: 8  •  pantoprazole (PROTONIX) 40 mg tablet, Take 1 tablet (40 mg total) by mouth daily Before breakfast and dinner (Patient taking differently: Take 40 mg by mouth daily in the early morning), Disp: 60 tablet, Rfl: 1  •  albuterol (PROVENTIL HFA,VENTOLIN HFA) 90 mcg/act inhaler, Inhale 2 puffs every 4 (four) hours as needed for shortness of breath (Patient not taking: No sig reported), Disp: 1 Inhaler, Rfl: 0  Allergies   Allergen Reactions   • Aluminum Anaphylaxis     Soap and shampoo with Aluminum   • Bee Venom Facial Swelling   • Ciprofloxacin Other (See Comments)     "Unknown"   • Doxycycline GI Intolerance     GI issues   • Oxycodone-Acetaminophen Itching     Itchy   • Percocet [Oxycodone-Acetaminophen]    • Clindamycin GI Intolerance       Objective     Current Vitals:   Blood Pressure: 126/88 (10/14/22 0758)  Pulse: 77 (10/14/22 0758)  Height: 5' 2" (157 5 cm) (10/14/22 0758)  Weight - Scale: 91 2 kg (201 lb) (10/14/22 0758)    Physical Exam  Vitals and nursing note reviewed  Skin:     Comments: Wound from the right groin measures approximately to cm, very shallow, 1 mm wide without evidence of infection or residual abscess, no redness

## 2023-01-05 DIAGNOSIS — K21.9 GASTROESOPHAGEAL REFLUX DISEASE: ICD-10-CM

## 2023-01-05 RX ORDER — PANTOPRAZOLE SODIUM 40 MG/1
40 TABLET, DELAYED RELEASE ORAL DAILY
Qty: 60 TABLET | Refills: 12 | Status: SHIPPED | OUTPATIENT
Start: 2023-01-05 | End: 2023-02-02

## 2023-01-05 RX ORDER — PANTOPRAZOLE SODIUM 40 MG/1
40 TABLET, DELAYED RELEASE ORAL DAILY
Qty: 60 TABLET | Refills: 1 | Status: SHIPPED | OUTPATIENT
Start: 2023-01-05

## 2023-02-21 DIAGNOSIS — K50.00 CROHN'S DISEASE INVOLVING TERMINAL ILEUM (HCC): ICD-10-CM

## 2023-02-22 RX ORDER — ADALIMUMAB 40MG/0.8ML
40 KIT SUBCUTANEOUS WEEKLY
Qty: 4 EACH | Refills: 11 | Status: SHIPPED | OUTPATIENT
Start: 2023-02-22

## 2023-10-12 ENCOUNTER — TELEPHONE (OUTPATIENT)
Dept: GASTROENTEROLOGY | Facility: CLINIC | Age: 33
End: 2023-10-12

## 2023-10-12 NOTE — TELEPHONE ENCOUNTER
Submitted a request for reauth of patients Humira 40mg/0.8ml every 7 days    Jose Raul Valdes Key: [id number] - PA Case ID: [id number]  Need help?  Call us at [phone]  Status   Sent to [facility]  Drug    Humira Pen 40MG/0.8ML pen-injector kit   Form    [facility] Electronic [place] Form (4828 NCPDP)

## 2023-10-13 NOTE — TELEPHONE ENCOUNTER
Received approval for Humira 40mg/0.8ml every 7 days. Valid from 10/13/23 - 10/13/24. PA# [id number]. Scanned in media.

## 2023-10-18 DIAGNOSIS — K50.00 CROHN'S DISEASE INVOLVING TERMINAL ILEUM (HCC): ICD-10-CM

## 2023-10-18 RX ORDER — ADALIMUMAB 40MG/0.8ML
40 KIT SUBCUTANEOUS WEEKLY
Qty: 4 EACH | Refills: 11 | Status: SHIPPED | OUTPATIENT
Start: 2023-10-18

## 2023-11-15 ENCOUNTER — TELEPHONE (OUTPATIENT)
Dept: GASTROENTEROLOGY | Facility: CLINIC | Age: 33
End: 2023-11-15

## 2023-11-15 NOTE — TELEPHONE ENCOUNTER
Prior Authorization needed     Key: [id number]    Medication: Humira Pen 40 MG/0.8 ML Pen-injector Kit    Sent to scan to chart

## 2023-11-16 NOTE — TELEPHONE ENCOUNTER
Called [facility] at [phone] and spoke to Ana. Explained that we have a prior auth that is valid until 10/24 for his Humira 40mg/0.8 ml pen injector kit. I provided him that auth number and he was able to send in a claim and was paid. He undated their records and put in a request to have the medication released. They will contact the patient.

## 2024-04-16 ENCOUNTER — OFFICE VISIT (OUTPATIENT)
Dept: GASTROENTEROLOGY | Facility: CLINIC | Age: 34
End: 2024-04-16
Payer: COMMERCIAL

## 2024-04-16 VITALS
BODY MASS INDEX: 39.23 KG/M2 | HEIGHT: 62 IN | OXYGEN SATURATION: 97 % | HEART RATE: 67 BPM | TEMPERATURE: 97.7 F | WEIGHT: 213.2 LBS | SYSTOLIC BLOOD PRESSURE: 124 MMHG | DIASTOLIC BLOOD PRESSURE: 80 MMHG

## 2024-04-16 DIAGNOSIS — K21.9 GASTROESOPHAGEAL REFLUX DISEASE: ICD-10-CM

## 2024-04-16 DIAGNOSIS — K50.018 CROHN'S DISEASE OF ILEUM, OTHER COMPLICATION (HCC): ICD-10-CM

## 2024-04-16 PROCEDURE — 99214 OFFICE O/P EST MOD 30 MIN: CPT | Performed by: INTERNAL MEDICINE

## 2024-04-16 RX ORDER — PANTOPRAZOLE SODIUM 40 MG/1
40 TABLET, DELAYED RELEASE ORAL DAILY
Qty: 180 TABLET | Refills: 3 | Status: SHIPPED | OUTPATIENT
Start: 2024-04-16

## 2024-04-16 RX ORDER — DICYCLOMINE HCL 20 MG
20 TABLET ORAL 3 TIMES DAILY PRN
Qty: 60 TABLET | Refills: 0 | Status: SHIPPED | OUTPATIENT
Start: 2024-04-16

## 2024-04-16 NOTE — PROGRESS NOTES
Follow-up Note -  [facility]  Getachew Kaye 1990 33 y.o. male     ASSESSMENT @ PLAN:   He is a 33-year-old male with small bowel Crohn's with 1-2 episodes a month of postprandial abdominal pain which is moderate to severe which last for about an hour.  He has been stable on weekly adalimumab for years his last colonoscopy was in April 2017 with small bowel ulcerations active terminal ileitis normal colon and normal endoscopy.  His GERD is quiescent at present.    1 we will do a colonoscopy to investigate    2 he will continue on his weekly adalimumab.  We will check a drug level and antibody level.  These were normal in July 2022.  We will also check prebiologic lab markers and inflammatory markers    3 renewed his pantoprazole in case he needs it but I him to stay off of it if he does not need it.    4 I also counseled him that his adalimumab will become generic shortly    Reason: GERD and small bowel Crohn's    HPI: He is a 33-year-old male with small bowel Crohn's who has done well over the years on adalimumab.  He is currently on adalimumab weekly.  His drug level and antibody levels were normal on the last time he checked.  Over the last 6 months he has had increasing episodes of postprandial lower and mid periumbilical abdominal pain.  This last for about an hour and the post eating.  And it can happen about 1-2 times per week.  His mother is concerned about him.  He had a colonoscopy with me in April 2017 when he was diagnosed with small bowel Crohn's and he had a normal colon at that time.  And normal endoscopy at that time.  He does have obesity with GERD that is under control because he ran out of his Protonix.  He has no heartburn regurgitation no dysphagia to solids liquids no nausea vomiting has been off Protonix for about 6 months.  He has no melena or hematochezia.  He has no joint or eye symptoms.    REVIEW OF SYSTEMS:     CONSTITUTIONAL: Denies any fever, chills, or rigors.  Good appetite, and no recent weight loss.  HEENT: No earache or tinnitus. Denies hearing loss or visual disturbances.  CARDIOVASCULAR: No chest pain or palpitations.   RESPIRATORY: Denies any cough, hemoptysis, shortness of breath or dyspnea on exertion.  GASTROINTESTINAL: As noted in the History of Present Illness.   GENITOURINARY: No problems with urination. Denies any hematuria or dysuria.  NEUROLOGIC: No dizziness or vertigo, denies headaches.   MUSCULOSKELETAL: Denies any muscle or joint pain.   SKIN: Denies skin rashes or itching.   ENDOCRINE: Denies excessive thirst. Denies intolerance to heat or cold.  PSYCHOSOCIAL: Denies depression or anxiety. Denies any recent memory loss.     Past Medical History:   Diagnosis Date    Bronchitis     CPAP (continuous positive airway pressure) dependence     Crohn's disease (HCC)     GERD (gastroesophageal reflux disease)     H/O angina pectoris     Hyperlipidemia     Sleep apnea       Past Surgical History:   Procedure Laterality Date    CARPAL TUNNEL RELEASE      CHOLECYSTECTOMY      HERNIA REPAIR      INCISION AND DRAINAGE OF WOUND Right 9/30/2022    Procedure: INCISION AND DRAINAGE (I&D) RIGHT GROIN ABSCESS;  Surgeon: Josafat Ingram MD;  Location: [facility];  Service: General    [place] ESOPHAGOGASTRODUODENOSCOPY TRANSORAL DIAGNOSTIC N/A 4/20/2017    Procedure: EGD AND COLONOSCOPY;  Surgeon: Venkata Hsu III, MD;  Location: [facility];  Service: Gastroenterology    SHOULDER SURGERY Right      Social History     Socioeconomic History    Marital status: Single     Spouse name: Not on file    Number of children: Not on file    Years of education: Not on file    Highest education level: Not on file   Occupational History    Not on file   Tobacco Use    Smoking status: Never    Smokeless tobacco: Never   Vaping Use    Vaping status: Never Used   Substance and Sexual Activity    Alcohol use: No    Drug use: Never    Sexual activity: Not on file   Other Topics Concern    Not  "on file   Social History Narrative    Not on file     Social Determinants of Health     Financial Resource Strain: Not on file   Food Insecurity: Not on file   Transportation Needs: Not on file   Physical Activity: Not on file   Stress: Not on file   Social Connections: Not on file   Intimate Partner Violence: Not on file   Housing Stability: Not on file     Family History   Problem Relation Age of Onset    Diabetes Mother      Aluminum, Bee venom, Ciprofloxacin, Dicyclomine, Doxycycline, Oxycodone-acetaminophen, Percocet [oxycodone-acetaminophen], and Clindamycin  Current Outpatient Medications   Medication Sig Dispense Refill    Adalimumab (Humira Pen) 40 MG/0.8ML PNKT Inject 0.8 mL (40 mg total) under the skin once a week Takes on Monday 4 each 11    dicyclomine (BENTYL) 20 mg tablet Take 1 tablet (20 mg total) by mouth 3 (three) times a day as needed (abdominal pain) 60 tablet 0    pantoprazole (PROTONIX) 40 mg tablet Take 1 tablet (40 mg total) by mouth daily 180 tablet 3     No current facility-administered medications for this visit.       Blood pressure 124/80, pulse 67, temperature 97.7 °F (36.5 °C), height 5' 2\" (1.575 m), weight 96.7 kg (213 lb 3.2 oz), SpO2 97%.    PHYSICAL EXAM:     General Appearance:   Alert, cooperative, no distress, appears stated age    HEENT:   Normocephalic, atraumatic, anicteric.     Neck:  Supple, symmetrical, trachea midline, no adenopathy;    thyroid: no enlargement/tenderness/nodules; no carotid  bruit or JVD    Lungs:   Clear to auscultation bilaterally; no rales, rhonchi or wheezing; respirations unlabored    Heart::   S1 and S2 normal; regular rate and rhythm; no murmur, rub, or gallop.   Abdomen:   Soft, non-tender, non-distended; normal bowel sounds; no masses, no organomegaly    Genitalia:   Deferred    Rectal:   Deferred    Extremities:  No cyanosis, clubbing or edema    Pulses:  2+ and symmetric all extremities    Skin:  Skin color, texture, turgor normal, no rashes " "or lesions    Lymph nodes:  No palpable cervical, axillary or inguinal lymphadenopathy        Lab Results   Component Value Date    WBC 12.83 (H) 09/22/2022    HGB 15.2 09/22/2022    HCT 45.5 09/22/2022    MCV 87 09/22/2022     09/22/2022     Lab Results   Component Value Date    CALCIUM 9.0 11/05/2022    K 4.4 11/05/2022    CO2 32 (H) 11/05/2022     11/05/2022    BUN 23 11/05/2022    CREATININE 0.91 11/05/2022     Lab Results   Component Value Date    ALT 65 (H) 11/05/2022    AST 27 11/05/2022    ALKPHOS 64 11/05/2022     No results found for: \"INR\", \"PROTIME\"          "

## 2024-04-16 NOTE — H&P (VIEW-ONLY)
Follow-up Note -  [facility]  Getachew Kaye 1990 33 y.o. male     ASSESSMENT @ PLAN:   He is a 33-year-old male with small bowel Crohn's with 1-2 episodes a month of postprandial abdominal pain which is moderate to severe which last for about an hour.  He has been stable on weekly adalimumab for years his last colonoscopy was in April 2017 with small bowel ulcerations active terminal ileitis normal colon and normal endoscopy.  His GERD is quiescent at present.    1 we will do a colonoscopy to investigate    2 he will continue on his weekly adalimumab.  We will check a drug level and antibody level.  These were normal in July 2022.  We will also check prebiologic lab markers and inflammatory markers    3 renewed his pantoprazole in case he needs it but I him to stay off of it if he does not need it.    4 I also counseled him that his adalimumab will become generic shortly    Reason: GERD and small bowel Crohn's    HPI: He is a 33-year-old male with small bowel Crohn's who has done well over the years on adalimumab.  He is currently on adalimumab weekly.  His drug level and antibody levels were normal on the last time he checked.  Over the last 6 months he has had increasing episodes of postprandial lower and mid periumbilical abdominal pain.  This last for about an hour and the post eating.  And it can happen about 1-2 times per week.  His mother is concerned about him.  He had a colonoscopy with me in April 2017 when he was diagnosed with small bowel Crohn's and he had a normal colon at that time.  And normal endoscopy at that time.  He does have obesity with GERD that is under control because he ran out of his Protonix.  He has no heartburn regurgitation no dysphagia to solids liquids no nausea vomiting has been off Protonix for about 6 months.  He has no melena or hematochezia.  He has no joint or eye symptoms.    REVIEW OF SYSTEMS:     CONSTITUTIONAL: Denies any fever, chills, or rigors.  Good appetite, and no recent weight loss.  HEENT: No earache or tinnitus. Denies hearing loss or visual disturbances.  CARDIOVASCULAR: No chest pain or palpitations.   RESPIRATORY: Denies any cough, hemoptysis, shortness of breath or dyspnea on exertion.  GASTROINTESTINAL: As noted in the History of Present Illness.   GENITOURINARY: No problems with urination. Denies any hematuria or dysuria.  NEUROLOGIC: No dizziness or vertigo, denies headaches.   MUSCULOSKELETAL: Denies any muscle or joint pain.   SKIN: Denies skin rashes or itching.   ENDOCRINE: Denies excessive thirst. Denies intolerance to heat or cold.  PSYCHOSOCIAL: Denies depression or anxiety. Denies any recent memory loss.     Past Medical History:   Diagnosis Date    Bronchitis     CPAP (continuous positive airway pressure) dependence     Crohn's disease (HCC)     GERD (gastroesophageal reflux disease)     H/O angina pectoris     Hyperlipidemia     Sleep apnea       Past Surgical History:   Procedure Laterality Date    CARPAL TUNNEL RELEASE      CHOLECYSTECTOMY      HERNIA REPAIR      INCISION AND DRAINAGE OF WOUND Right 9/30/2022    Procedure: INCISION AND DRAINAGE (I&D) RIGHT GROIN ABSCESS;  Surgeon: Josafat Ingram MD;  Location: [facility];  Service: General    PA ESOPHAGOGASTRODUODENOSCOPY TRANSORAL DIAGNOSTIC N/A 4/20/2017    Procedure: EGD AND COLONOSCOPY;  Surgeon: Venkata Hsu III, MD;  Location: [facility];  Service: Gastroenterology    SHOULDER SURGERY Right      Social History     Socioeconomic History    Marital status: Single     Spouse name: Not on file    Number of children: Not on file    Years of education: Not on file    Highest education level: Not on file   Occupational History    Not on file   Tobacco Use    Smoking status: Never    Smokeless tobacco: Never   Vaping Use    Vaping status: Never Used   Substance and Sexual Activity    Alcohol use: No    Drug use: Never    Sexual activity: Not on file   Other Topics Concern    Not  "on file   Social History Narrative    Not on file     Social Determinants of Health     Financial Resource Strain: Not on file   Food Insecurity: Not on file   Transportation Needs: Not on file   Physical Activity: Not on file   Stress: Not on file   Social Connections: Not on file   Intimate Partner Violence: Not on file   Housing Stability: Not on file     Family History   Problem Relation Age of Onset    Diabetes Mother      Aluminum, Bee venom, Ciprofloxacin, Dicyclomine, Doxycycline, Oxycodone-acetaminophen, Percocet [oxycodone-acetaminophen], and Clindamycin  Current Outpatient Medications   Medication Sig Dispense Refill    Adalimumab (Humira Pen) 40 MG/0.8ML PNKT Inject 0.8 mL (40 mg total) under the skin once a week Takes on Monday 4 each 11    dicyclomine (BENTYL) 20 mg tablet Take 1 tablet (20 mg total) by mouth 3 (three) times a day as needed (abdominal pain) 60 tablet 0    pantoprazole (PROTONIX) 40 mg tablet Take 1 tablet (40 mg total) by mouth daily 180 tablet 3     No current facility-administered medications for this visit.       Blood pressure 124/80, pulse 67, temperature 97.7 °F (36.5 °C), height 5' 2\" (1.575 m), weight 96.7 kg (213 lb 3.2 oz), SpO2 97%.    PHYSICAL EXAM:     General Appearance:   Alert, cooperative, no distress, appears stated age    HEENT:   Normocephalic, atraumatic, anicteric.     Neck:  Supple, symmetrical, trachea midline, no adenopathy;    thyroid: no enlargement/tenderness/nodules; no carotid  bruit or JVD    Lungs:   Clear to auscultation bilaterally; no rales, rhonchi or wheezing; respirations unlabored    Heart::   S1 and S2 normal; regular rate and rhythm; no murmur, rub, or gallop.   Abdomen:   Soft, non-tender, non-distended; normal bowel sounds; no masses, no organomegaly    Genitalia:   Deferred    Rectal:   Deferred    Extremities:  No cyanosis, clubbing or edema    Pulses:  2+ and symmetric all extremities    Skin:  Skin color, texture, turgor normal, no rashes " "or lesions    Lymph nodes:  No palpable cervical, axillary or inguinal lymphadenopathy        Lab Results   Component Value Date    WBC 12.83 (H) 09/22/2022    HGB 15.2 09/22/2022    HCT 45.5 09/22/2022    MCV 87 09/22/2022     09/22/2022     Lab Results   Component Value Date    CALCIUM 9.0 11/05/2022    K 4.4 11/05/2022    CO2 32 (H) 11/05/2022     11/05/2022    BUN 23 11/05/2022    CREATININE 0.91 11/05/2022     Lab Results   Component Value Date    ALT 65 (H) 11/05/2022    AST 27 11/05/2022    ALKPHOS 64 11/05/2022     No results found for: \"INR\", \"PROTIME\"          "

## 2024-04-16 NOTE — PATIENT INSTRUCTIONS
Scheduled date of colonoscopy (as of today):4/24/24  Physician performing colonoscopy:Sahdi  Location of colonoscopy:[place]  Bowel prep reviewed with patient:Madai/Miralax  Instructions reviewed with patient by:Vic rich  Clearances:  none

## 2024-04-22 ENCOUNTER — APPOINTMENT (OUTPATIENT)
Dept: LAB | Facility: HOSPITAL | Age: 34
End: 2024-04-22
Payer: COMMERCIAL

## 2024-04-22 DIAGNOSIS — K50.018 CROHN'S DISEASE OF ILEUM, OTHER COMPLICATION (HCC): ICD-10-CM

## 2024-04-22 LAB
CRP SERPL QL: 2.5 MG/L
ERYTHROCYTE [DISTWIDTH] IN BLOOD BY AUTOMATED COUNT: 12.9 % (ref 11.6–15.1)
ERYTHROCYTE [SEDIMENTATION RATE] IN BLOOD: 5 MM/HOUR (ref 0–14)
HBV CORE AB SER QL: NORMAL
HBV CORE IGM SER QL: NORMAL
HBV SURFACE AG SER QL: NORMAL
HCT VFR BLD AUTO: 46.6 % (ref 36.5–49.3)
HCV AB SER QL: NORMAL
HGB BLD-MCNC: 15.8 G/DL (ref 12–17)
MCH RBC QN AUTO: 29.3 PG (ref 26.8–34.3)
MCHC RBC AUTO-ENTMCNC: 33.9 G/DL (ref 31.4–37.4)
MCV RBC AUTO: 87 FL (ref 82–98)
PLATELET # BLD AUTO: 331 THOUSANDS/UL (ref 149–390)
PMV BLD AUTO: 9.6 FL (ref 8.9–12.7)
RBC # BLD AUTO: 5.39 MILLION/UL (ref 3.88–5.62)
WBC # BLD AUTO: 10.72 THOUSAND/UL (ref 4.31–10.16)

## 2024-04-22 PROCEDURE — 82397 CHEMILUMINESCENT ASSAY: CPT

## 2024-04-22 PROCEDURE — 86803 HEPATITIS C AB TEST: CPT

## 2024-04-22 PROCEDURE — 86480 TB TEST CELL IMMUN MEASURE: CPT

## 2024-04-22 PROCEDURE — 80145 DRUG ASSAY ADALIMUMAB: CPT

## 2024-04-22 PROCEDURE — 86704 HEP B CORE ANTIBODY TOTAL: CPT

## 2024-04-22 PROCEDURE — 86705 HEP B CORE ANTIBODY IGM: CPT

## 2024-04-22 PROCEDURE — 85027 COMPLETE CBC AUTOMATED: CPT

## 2024-04-22 PROCEDURE — 87340 HEPATITIS B SURFACE AG IA: CPT

## 2024-04-22 PROCEDURE — 86140 C-REACTIVE PROTEIN: CPT

## 2024-04-22 PROCEDURE — 85652 RBC SED RATE AUTOMATED: CPT

## 2024-04-22 PROCEDURE — 36415 COLL VENOUS BLD VENIPUNCTURE: CPT

## 2024-04-23 LAB
GAMMA INTERFERON BACKGROUND BLD IA-ACNC: 0.02 IU/ML
M TB IFN-G BLD-IMP: NEGATIVE
M TB IFN-G CD4+ BCKGRND COR BLD-ACNC: 0.01 IU/ML
M TB IFN-G CD4+ BCKGRND COR BLD-ACNC: 0.02 IU/ML
MITOGEN IGNF BCKGRD COR BLD-ACNC: 9.98 IU/ML

## 2024-04-24 ENCOUNTER — ANESTHESIA EVENT (OUTPATIENT)
Dept: GASTROENTEROLOGY | Facility: HOSPITAL | Age: 34
End: 2024-04-24

## 2024-04-24 ENCOUNTER — HOSPITAL ENCOUNTER (OUTPATIENT)
Dept: GASTROENTEROLOGY | Facility: HOSPITAL | Age: 34
Setting detail: OUTPATIENT SURGERY
Discharge: HOME | End: 2024-04-24
Attending: INTERNAL MEDICINE
Payer: COMMERCIAL

## 2024-04-24 ENCOUNTER — ANESTHESIA (OUTPATIENT)
Dept: GASTROENTEROLOGY | Facility: HOSPITAL | Age: 34
End: 2024-04-24

## 2024-04-24 VITALS
DIASTOLIC BLOOD PRESSURE: 80 MMHG | SYSTOLIC BLOOD PRESSURE: 129 MMHG | HEIGHT: 62 IN | BODY MASS INDEX: 38.62 KG/M2 | WEIGHT: 209.88 LBS | HEART RATE: 68 BPM | RESPIRATION RATE: 16 BRPM | TEMPERATURE: 97.8 F | OXYGEN SATURATION: 95 %

## 2024-04-24 DIAGNOSIS — K50.018 CROHN'S DISEASE OF ILEUM, OTHER COMPLICATION (HCC): ICD-10-CM

## 2024-04-24 PROCEDURE — 45378 DIAGNOSTIC COLONOSCOPY: CPT | Performed by: INTERNAL MEDICINE

## 2024-04-24 RX ORDER — LIDOCAINE HYDROCHLORIDE 20 MG/ML
INJECTION, SOLUTION EPIDURAL; INFILTRATION; INTRACAUDAL; PERINEURAL AS NEEDED
Status: DISCONTINUED | OUTPATIENT
Start: 2024-04-24 | End: 2024-04-24

## 2024-04-24 RX ORDER — PROPOFOL 10 MG/ML
INJECTION, EMULSION INTRAVENOUS AS NEEDED
Status: DISCONTINUED | OUTPATIENT
Start: 2024-04-24 | End: 2024-04-24

## 2024-04-24 RX ORDER — SODIUM CHLORIDE, SODIUM LACTATE, POTASSIUM CHLORIDE, CALCIUM CHLORIDE 600; 310; 30; 20 MG/100ML; MG/100ML; MG/100ML; MG/100ML
INJECTION, SOLUTION INTRAVENOUS CONTINUOUS PRN
Status: DISCONTINUED | OUTPATIENT
Start: 2024-04-24 | End: 2024-04-24

## 2024-04-24 RX ADMIN — PROPOFOL 150 MG: 10 INJECTION, EMULSION INTRAVENOUS at 11:40

## 2024-04-24 RX ADMIN — LIDOCAINE HYDROCHLORIDE 100 MG: 20 INJECTION, SOLUTION EPIDURAL; INFILTRATION; INTRACAUDAL; PERINEURAL at 11:41

## 2024-04-24 RX ADMIN — PROPOFOL 50 MG: 10 INJECTION, EMULSION INTRAVENOUS at 11:45

## 2024-04-24 RX ADMIN — SODIUM CHLORIDE, SODIUM LACTATE, POTASSIUM CHLORIDE, AND CALCIUM CHLORIDE: .6; .31; .03; .02 INJECTION, SOLUTION INTRAVENOUS at 11:35

## 2024-04-24 NOTE — ANESTHESIA POSTPROCEDURE EVALUATION
Post-Op Assessment Note    CV Status:  Stable  Pain Score: 0    Pain management: adequate       Mental Status:  Alert and awake   Hydration Status:  Euvolemic   PONV Controlled:  Controlled   Airway Patency:  Patent     Post Op Vitals Reviewed: Yes    No anethesia notable event occurred.    Staff: CRNA               /68 (04/24/24 1150)    Temp      Pulse 69 (04/24/24 1150)   Resp 13 (04/24/24 1150)    SpO2 95 % (04/24/24 1150)

## 2024-04-24 NOTE — INTERVAL H&P NOTE
H&P reviewed. After examining the patient I find no changes in the patients condition since the H&P had been written.    Vitals:    04/24/24 1014   BP: 116/67   Pulse: 61   Resp: 18   Temp: 97.6 °F (36.4 °C)   SpO2: 98%

## 2024-04-24 NOTE — ANESTHESIA PREPROCEDURE EVALUATION
Procedure:  COLONOSCOPY  ASSESSMENT @ PLAN:   He is a 33-year-old male with small bowel Crohn's with 1-2 episodes a month of postprandial abdominal pain which is moderate to severe which last for about an hour.  He has been stable on weekly adalimumab for years his last colonoscopy was in April 2017 with small bowel ulcerations active terminal ileitis normal colon and normal endoscopy.  His GERD is quiescent at present.     1 we will do a colonoscopy to investigate     2 he will continue on his weekly adalimumab.  We will check a drug level and antibody level.  These were normal in July 2022.  We will also check prebiologic lab markers and inflammatory markers     3 renewed his pantoprazole in case he needs it but I him to stay off of it if he does not need it.     4 I also counseled him that his adalimumab will become generic shortly     Reason: GERD and small bowel Crohn's     HPI: He is a 33-year-old male with small bowel Crohn's who has done well over the years on adalimumab.  He is currently on adalimumab weekly.  His drug level and antibody levels were normal on the last time he checked.  Over the last 6 months he has had increasing episodes of postprandial lower and mid periumbilical abdominal pain.  This last for about an hour and the post eating.  And it can happen about 1-2 times per week.  His mother is concerned about him.  He had a colonoscopy with me in April 2017 when he was diagnosed with small bowel Crohn's and he had a normal colon at that time.  And normal endoscopy at that time.  He does have obesity with GERD that is under control because he ran out of his Protonix.  He has no heartburn regurgitation no dysphagia to solids liquids no nausea vomiting has been off Protonix for about 6 months.  He has no melena or hematochezia.  He has no joint or eye symptoms.  Relevant Problems   CARDIO   (+) HLD (hyperlipidemia)      GI/HEPATIC   (+) Fatty liver   (+) GERD without esophagitis      PULMONARY    (+) MAYO (obstructive sleep apnea)        Physical Exam    Airway    Mallampati score: III  TM Distance: >3 FB  Neck ROM: full     Dental       Cardiovascular  Cardiovascular exam normal    Pulmonary  Pulmonary exam normal     Other Findings      History Comments History Comments   Hyperlipidemia  H/O angina pectoris    GERD (gastroesophageal reflux disease)  Sleep apnea    Bronchitis  CPAP (continuous positive airway pressure) dependence    Crohn's disease (HCC)        Surgical History   Current as of 04/24/24 1106  CHOLECYSTECTOMY HERNIA REPAIR   CARPAL TUNNEL RELEASE MO ESOPHAGOGASTRODUODENOSCOPY TRANSORAL DIAGNOSTIC   SHOULDER SURGERY INCISION AND DRAINAGE OF WOUND   FOOT FUSION      Substance History   Current as of 04/24/24 1106  Smoking Status: Never   Smokeless Tobacco Status: Never   Alcohol use: No   Drug use: Never       Anesthesia Plan  ASA Score- 2     Anesthesia Type- IV sedation with anesthesia with ASA Monitors.         Additional Monitors:     Airway Plan:            Plan Factors-    Chart reviewed. EKG reviewed.  Existing labs reviewed. Patient summary reviewed.    Patient is not a current smoker.              Induction- intravenous.    Postoperative Plan-     Informed Consent- Anesthetic plan and risks discussed with patient.  I personally reviewed this patient with the CRNA. Discussed and agreed on the Anesthesia Plan with the CRNA..

## 2024-04-30 LAB
ADALIMUMAB AB SERPL-MCNC: <25 NG/ML
ADALIMUMAB SERPL-MCNC: 22 UG/ML

## 2024-09-16 ENCOUNTER — TELEPHONE (OUTPATIENT)
Age: 34
End: 2024-09-16

## 2024-09-19 NOTE — TELEPHONE ENCOUNTER
Franc calling from [facility] in regards to authorization for Humira 4 pens. Is asking once authorization is approved if it can be faxed to [facility] at [phone].

## 2024-10-02 DIAGNOSIS — K50.018 CROHN'S DISEASE OF ILEUM, OTHER COMPLICATION (HCC): Primary | ICD-10-CM

## 2024-10-03 ENCOUNTER — TELEPHONE (OUTPATIENT)
Age: 34
End: 2024-10-03

## 2024-10-03 DIAGNOSIS — K50.018 CROHN'S DISEASE OF ILEUM, OTHER COMPLICATION (HCC): Primary | ICD-10-CM

## 2024-10-03 RX ORDER — BUDESONIDE 3 MG/1
CAPSULE, COATED PELLETS ORAL
Qty: 120 CAPSULE | Refills: 0 | Status: SHIPPED | OUTPATIENT
Start: 2024-10-03

## 2024-10-03 NOTE — TELEPHONE ENCOUNTER
I called the patient and informed budesonide taper has been sent to [facility] and to keep our office updated if his symptoms do not improve in the next few days after starting budesonide and taking Humira injection.

## 2024-10-03 NOTE — TELEPHONE ENCOUNTER
Regarding: abd pain/cramping  ----- Message from Estrella CODY sent at 10/3/2024 10:50 AM EDT -----  Pt's mother called in stating that pt is waiting on his Humira and he is having a lot of abd pain, cramping and it feels as if his insides are swollen. Please reach out to the son on his cell phone to discuss further.

## 2024-10-03 NOTE — TELEPHONE ENCOUNTER
I called and spoke with the patient.     Last OV 4/16/24 Dr. Hsu  Colonoscopy 4/24/24    Due to insurance pt now switching from weekly Humira to every 14 days. New prescription has been sent to [facility]- pt to verify with [facility] if Humira is being shipped. Pt's last Humira was on 9/23. Pt reports for the past 3 days bowel frequency has been increasing and only going a small amount each time. 8-9 x yesterday and 4 x today, pt repots bowel movements are feeling incomplete. Denies blood or mucous. Pt reports right and left lower abdomen cramping, Pain all the time, gets worse after eating.    Pt states was prescribed budesonide in the past which helped.

## 2024-10-22 ENCOUNTER — TELEPHONE (OUTPATIENT)
Age: 34
End: 2024-10-22

## 2024-10-22 NOTE — TELEPHONE ENCOUNTER
Bonnie calling in from [facility] to see if we received fax for humira auth that will  on 10/31.     Key code: [id number]

## 2024-10-23 NOTE — TELEPHONE ENCOUNTER
Authorization is on file. Submitted auth under keycode given and was told no auth needed.     Uploaded this to [facility] portal

## 2024-10-30 ENCOUNTER — TELEPHONE (OUTPATIENT)
Dept: GASTROENTEROLOGY | Facility: CLINIC | Age: 34
End: 2024-10-30

## 2024-11-02 DIAGNOSIS — K50.018 CROHN'S DISEASE OF ILEUM, OTHER COMPLICATION (HCC): ICD-10-CM

## 2024-11-04 RX ORDER — BUDESONIDE 3 MG/1
CAPSULE, COATED PELLETS ORAL
Qty: 360 CAPSULE | Refills: 1 | Status: SHIPPED | OUTPATIENT
Start: 2024-11-04 | End: 2024-11-08

## 2024-11-05 ENCOUNTER — LAB (OUTPATIENT)
Dept: LAB | Facility: HOSPITAL | Age: 34
End: 2024-11-05
Payer: COMMERCIAL

## 2024-11-05 DIAGNOSIS — K50.018 CROHN'S DISEASE OF ILEUM, OTHER COMPLICATION (HCC): Primary | ICD-10-CM

## 2024-11-05 DIAGNOSIS — K50.018 CROHN'S DISEASE OF ILEUM, OTHER COMPLICATION (HCC): ICD-10-CM

## 2024-11-05 LAB
CRP SERPL QL: 90.2 MG/L
ERYTHROCYTE [DISTWIDTH] IN BLOOD BY AUTOMATED COUNT: 12.8 % (ref 11.6–15.1)
HCT VFR BLD AUTO: 41.6 % (ref 36.5–49.3)
HGB BLD-MCNC: 13.7 G/DL (ref 12–17)
MCH RBC QN AUTO: 28.6 PG (ref 26.8–34.3)
MCHC RBC AUTO-ENTMCNC: 32.9 G/DL (ref 31.4–37.4)
MCV RBC AUTO: 87 FL (ref 82–98)
PLATELET # BLD AUTO: 384 THOUSANDS/UL (ref 149–390)
PMV BLD AUTO: 9.1 FL (ref 8.9–12.7)
RBC # BLD AUTO: 4.79 MILLION/UL (ref 3.88–5.62)
WBC # BLD AUTO: 17.92 THOUSAND/UL (ref 4.31–10.16)

## 2024-11-05 PROCEDURE — 36415 COLL VENOUS BLD VENIPUNCTURE: CPT

## 2024-11-05 PROCEDURE — 85027 COMPLETE CBC AUTOMATED: CPT

## 2024-11-05 PROCEDURE — 86140 C-REACTIVE PROTEIN: CPT

## 2024-11-05 RX ORDER — ADALIMUMAB 40MG/0.4ML
KIT SUBCUTANEOUS
COMMUNITY
Start: 2024-10-30

## 2024-11-05 NOTE — TELEPHONE ENCOUNTER
Patient mother called in patient is having GI ISSUES is only taking the Humria every other week may need to go back to weekly however his Ins only covers every other week but the Mother stated he is having some many problems she would like to speak with a Nurse to discuss he may need an alter med to help him the budesonide is not working per patient Mother is on consent to speak with

## 2024-11-05 NOTE — TELEPHONE ENCOUNTER
Spoke with patient's mother, reviewed provider recommendations. Mother will speak to the patient and if pt is not passing  bowel movements or gas to go to the ED. Patient states when she previously spoke with the patient he reports bowel movement on 11/4. Patient mothers verbalized understanding pt to start Colace 100 mg twice daily and blood work was ordered and to keep follow-up as scheduled this Friday .

## 2024-11-05 NOTE — TELEPHONE ENCOUNTER
Last OV 4/16/2024 Dr. Hsu  Next OV     I spoke with the patient's mother. Mother states for the past month since Humira was changed from weekly to every other week pts bowel pattern has changed from multiple bowel movements daily to small bowel movements every other day. Pt reports very small bowel movements every other day, pt took magnesium citrate x 2 without relief.  Humira every other week, last injection 10/28. Taking budesonide, tapered to 1 mg and mother increased to 3 mg on Saturday due to pts symptoms. Pts mother states patient c/o of left lower abdominal pain, at times sharp, unsure if patient has a fever.     Appointment scheduled on 11/8 with MIKIE Pagan PA-C    Please advise.

## 2024-11-05 NOTE — TELEPHONE ENCOUNTER
If patient is not passing any bowel movements or gas, would need to go to the emergency room for imaging.  However, sounds like he is still having bowel function.  Recommend that he start Colace 100 mg twice daily.  I also put in blood work.  Keep follow-up as scheduled this Friday.  Thank you

## 2024-11-06 ENCOUNTER — HOSPITAL ENCOUNTER (EMERGENCY)
Facility: HOSPITAL | Age: 34
Discharge: HOME | End: 2024-11-06
Attending: EMERGENCY MEDICINE | Admitting: EMERGENCY MEDICINE
Payer: COMMERCIAL

## 2024-11-06 ENCOUNTER — APPOINTMENT (EMERGENCY)
Dept: CT IMAGING | Facility: HOSPITAL | Age: 34
End: 2024-11-06
Payer: COMMERCIAL

## 2024-11-06 ENCOUNTER — TELEPHONE (OUTPATIENT)
Dept: GASTROENTEROLOGY | Facility: CLINIC | Age: 34
End: 2024-11-06

## 2024-11-06 VITALS
RESPIRATION RATE: 20 BRPM | WEIGHT: 206.13 LBS | HEIGHT: 62 IN | OXYGEN SATURATION: 98 % | HEART RATE: 84 BPM | BODY MASS INDEX: 37.93 KG/M2 | DIASTOLIC BLOOD PRESSURE: 86 MMHG | SYSTOLIC BLOOD PRESSURE: 140 MMHG | TEMPERATURE: 97.7 F

## 2024-11-06 DIAGNOSIS — K52.9 COLITIS: Primary | ICD-10-CM

## 2024-11-06 DIAGNOSIS — K50.018 CROHN'S DISEASE OF ILEUM, OTHER COMPLICATION (HCC): Primary | ICD-10-CM

## 2024-11-06 LAB
ALBUMIN SERPL BCG-MCNC: 4.3 G/DL (ref 3.5–5)
ALP SERPL-CCNC: 46 U/L (ref 34–104)
ALT SERPL W P-5'-P-CCNC: 30 U/L (ref 7–52)
ANION GAP SERPL CALCULATED.3IONS-SCNC: 8 MMOL/L (ref 4–13)
AST SERPL W P-5'-P-CCNC: 18 U/L (ref 13–39)
BASOPHILS # BLD AUTO: 0.03 THOUSANDS/ΜL (ref 0–0.1)
BASOPHILS NFR BLD AUTO: 0 % (ref 0–1)
BILIRUB SERPL-MCNC: 0.69 MG/DL (ref 0.2–1)
BUN SERPL-MCNC: 21 MG/DL (ref 5–25)
CALCIUM SERPL-MCNC: 9.7 MG/DL (ref 8.4–10.2)
CHLORIDE SERPL-SCNC: 101 MMOL/L (ref 96–108)
CO2 SERPL-SCNC: 28 MMOL/L (ref 21–32)
CREAT SERPL-MCNC: 0.76 MG/DL (ref 0.6–1.3)
EOSINOPHIL # BLD AUTO: 0.13 THOUSAND/ΜL (ref 0–0.61)
EOSINOPHIL NFR BLD AUTO: 1 % (ref 0–6)
ERYTHROCYTE [DISTWIDTH] IN BLOOD BY AUTOMATED COUNT: 12.6 % (ref 11.6–15.1)
GFR SERPL CREATININE-BSD FRML MDRD: 118 ML/MIN/1.73SQ M
GLUCOSE SERPL-MCNC: 80 MG/DL (ref 65–140)
HCT VFR BLD AUTO: 40.8 % (ref 36.5–49.3)
HGB BLD-MCNC: 13.8 G/DL (ref 12–17)
IMM GRANULOCYTES # BLD AUTO: 0.05 THOUSAND/UL (ref 0–0.2)
IMM GRANULOCYTES NFR BLD AUTO: 0 % (ref 0–2)
LYMPHOCYTES # BLD AUTO: 3.3 THOUSANDS/ΜL (ref 0.6–4.47)
LYMPHOCYTES NFR BLD AUTO: 24 % (ref 14–44)
MCH RBC QN AUTO: 28.9 PG (ref 26.8–34.3)
MCHC RBC AUTO-ENTMCNC: 33.8 G/DL (ref 31.4–37.4)
MCV RBC AUTO: 85 FL (ref 82–98)
MONOCYTES # BLD AUTO: 1 THOUSAND/ΜL (ref 0.17–1.22)
MONOCYTES NFR BLD AUTO: 7 % (ref 4–12)
NEUTROPHILS # BLD AUTO: 9.23 THOUSANDS/ΜL (ref 1.85–7.62)
NEUTS SEG NFR BLD AUTO: 68 % (ref 43–75)
NRBC BLD AUTO-RTO: 0 /100 WBCS
PLATELET # BLD AUTO: 364 THOUSANDS/UL (ref 149–390)
PMV BLD AUTO: 8.8 FL (ref 8.9–12.7)
POTASSIUM SERPL-SCNC: 4.5 MMOL/L (ref 3.5–5.3)
PROT SERPL-MCNC: 7.7 G/DL (ref 6.4–8.4)
RBC # BLD AUTO: 4.78 MILLION/UL (ref 3.88–5.62)
SODIUM SERPL-SCNC: 137 MMOL/L (ref 135–147)
WBC # BLD AUTO: 13.74 THOUSAND/UL (ref 4.31–10.16)

## 2024-11-06 PROCEDURE — 80053 COMPREHEN METABOLIC PANEL: CPT | Performed by: EMERGENCY MEDICINE

## 2024-11-06 PROCEDURE — 85025 COMPLETE CBC W/AUTO DIFF WBC: CPT | Performed by: EMERGENCY MEDICINE

## 2024-11-06 PROCEDURE — 36415 COLL VENOUS BLD VENIPUNCTURE: CPT | Performed by: EMERGENCY MEDICINE

## 2024-11-06 PROCEDURE — 99284 EMERGENCY DEPT VISIT MOD MDM: CPT

## 2024-11-06 PROCEDURE — 96361 HYDRATE IV INFUSION ADD-ON: CPT

## 2024-11-06 PROCEDURE — 96360 HYDRATION IV INFUSION INIT: CPT

## 2024-11-06 PROCEDURE — 99285 EMERGENCY DEPT VISIT HI MDM: CPT | Performed by: EMERGENCY MEDICINE

## 2024-11-06 PROCEDURE — 74177 CT ABD & PELVIS W/CONTRAST: CPT

## 2024-11-06 RX ADMIN — SODIUM CHLORIDE 1000 ML: 0.9 INJECTION, SOLUTION INTRAVENOUS at 17:45

## 2024-11-06 RX ADMIN — IOHEXOL 100 ML: 350 INJECTION, SOLUTION INTRAVENOUS at 18:41

## 2024-11-06 NOTE — TELEPHONE ENCOUNTER
----- Message from Vikki Matthews PA-C sent at 11/5/2024  5:19 PM EST -----  Please let patient know that his inflammatory markers are very high. If he is not feeling well, I think it would be better if he went to the emergency room for IV, steroids and CT scan to make sure that he does not have a blockage from inflammation. Sadly, his body was very affected by the fact that he’s not getting his Humira every week due to his insurance company.

## 2024-11-06 NOTE — ED PROVIDER NOTES
Time reflects when diagnosis was documented in both MDM as applicable and the Disposition within this note       Time User Action Codes Description Comment    11/6/2024  7:43 PM Getachew Kenny Add [K52.9] Colitis           ED Disposition       ED Disposition   Discharge    Condition   Stable    Date/Time   Wed Nov 6, 2024  7:42 PM    Comment   Getachew Kaye discharge to home/self care.                   Assessment & Plan       Medical Decision Making  34-year-old male, history of Crohn's disease, presenting with lower abdominal pain.  Differential diagnosis includes bowel obstruction, acute colitis, dehydration among other diagnoses.  Had outpatient labs with GI showing increased inflammatory markers.  CT scan and labs ordered in ED, patient received IV fluids.    CT scan with findings of colitis, no obstruction, perforation, or abscess.  Patient feels well and is requesting to be discharged home, has follow-up scheduled with GI this week.  Discussed with patient return to emergency department for any worsening or new concerning symptoms.    I have reviewed test results and diagnosis with patient.  Follow-up plan reviewed.  Precautions for acute return for re-evaluation are reviewed.  Opportunity to ask questions was provided.  Patient verbalizes understanding.      Amount and/or Complexity of Data Reviewed  External Data Reviewed: labs and notes.  Labs: ordered. Decision-making details documented in ED Course.  Radiology: ordered. Decision-making details documented in ED Course.    Risk  Prescription drug management.        ED Course as of 11/06/24 1953 Wed Nov 06, 2024 1947 Patient comfortable, results reviewed and discussed with him.  Patient feels well to be discharged home, tolerating oral intake.       Medications   sodium chloride 0.9 % bolus 1,000 mL (0 mL Intravenous Stopped 11/6/24 1949)   iohexol (OMNIPAQUE) 350 MG/ML injection (MULTI-DOSE) 100 mL (100 mL Intravenous Given 11/6/24 1841)       ED Risk  Strat Scores                           SBIRT 22yo+      Flowsheet Row Most Recent Value   Initial Alcohol Screen: US AUDIT-C     1. How often do you have a drink containing alcohol? 0 Filed at: 11/06/2024 1634   2. How many drinks containing alcohol do you have on a typical day you are drinking?  0 Filed at: 11/06/2024 1634   3a. Male UNDER 65: How often do you have five or more drinks on one occasion? 0 Filed at: 11/06/2024 1634   Audit-C Score 0 Filed at: 11/06/2024 1634   HAYDE: How many times in the past year have you...    Used an illegal drug or used a prescription medication for non-medical reasons? Never Filed at: 11/06/2024 1634                            History of Present Illness       Chief Complaint   Patient presents with    Abnormal Lab     Patient sent by PCP for CRP 90 and white count 17,000. PCP requesting imaging        Past Medical History:   Diagnosis Date    Bronchitis     CPAP (continuous positive airway pressure) dependence     Crohn's disease (HCC)     GERD (gastroesophageal reflux disease)     H/O angina pectoris     Hyperlipidemia     Sleep apnea       Past Surgical History:   Procedure Laterality Date    CARPAL TUNNEL RELEASE      CHOLECYSTECTOMY      FOOT FUSION Right     pin    HERNIA REPAIR      INCISION AND DRAINAGE OF WOUND Right 09/30/2022    Procedure: INCISION AND DRAINAGE (I&D) RIGHT GROIN ABSCESS;  Surgeon: Josafat Ingram MD;  Location: [facility];  Service: General    [place] ESOPHAGOGASTRODUODENOSCOPY TRANSORAL DIAGNOSTIC N/A 04/20/2017    Procedure: EGD AND COLONOSCOPY;  Surgeon: Venkata Hsu III, MD;  Location: [facility];  Service: Gastroenterology    SHOULDER SURGERY Right       Family History   Problem Relation Age of Onset    Diabetes Mother       Social History     Tobacco Use    Smoking status: Never    Smokeless tobacco: Never   Vaping Use    Vaping status: Never Used   Substance Use Topics    Alcohol use: No    Drug use: Never      E-Cigarette/Vaping    E-Cigarette Use  Never User       E-Cigarette/Vaping Substances      I have reviewed and agree with the history as documented.     34-year-old male, history of Crohn's disease, presenting with abdominal pain.  Patient has been having worsening pain, cramping across lower abdomen, for the past few days.  Patient has decreased appetite and constipation, denies any vomiting or fevers.      History provided by:  Patient   used: No        Review of Systems   Constitutional:  Positive for appetite change. Negative for fever.   Respiratory: Negative.     Cardiovascular: Negative.    Gastrointestinal:  Positive for abdominal pain. Negative for vomiting.   Neurological: Negative.            Objective       ED Triage Vitals [11/06/24 1631]   Temperature Pulse Blood Pressure Respirations SpO2 Patient Position - Orthostatic VS   97.7 °F (36.5 °C) 84 140/86 20 98 % Sitting      Temp Source Heart Rate Source BP Location FiO2 (%) Pain Score    Temporal Monitor Left arm -- --      Vitals      Date and Time Temp Pulse SpO2 Resp BP Pain Score FACES Pain Rating User   11/06/24 1631 97.7 °F (36.5 °C) 84 98 % 20 140/86 -- -- RO            Physical Exam  Vitals and nursing note reviewed.   Constitutional:       General: He is not in acute distress.  HENT:      Head: Normocephalic and atraumatic.      Mouth/Throat:      Mouth: Mucous membranes are moist.      Pharynx: Oropharynx is clear.   Cardiovascular:      Rate and Rhythm: Normal rate.   Pulmonary:      Effort: Pulmonary effort is normal.   Abdominal:      General: There is no distension.      Palpations: Abdomen is soft.      Comments: Tenderness across lower abdomen, no rebound or guarding   Musculoskeletal:         General: Normal range of motion.   Skin:     General: Skin is warm and dry.   Neurological:      General: No focal deficit present.      Mental Status: He is alert and oriented to person, place, and time.      Motor: No weakness.      Gait: Gait normal.          Results Reviewed       Procedure Component Value Units Date/Time    Comprehensive metabolic panel [[id number]] Collected: 11/06/24 1742    Lab Status: Final result Specimen: Blood from Arm, Right Updated: 11/06/24 1802     Sodium 137 mmol/L      Potassium 4.5 mmol/L      Chloride 101 mmol/L      CO2 28 mmol/L      ANION GAP 8 mmol/L      BUN 21 mg/dL      Creatinine 0.76 mg/dL      Glucose 80 mg/dL      Calcium 9.7 mg/dL      AST 18 U/L      ALT 30 U/L      Alkaline Phosphatase 46 U/L      Total Protein 7.7 g/dL      Albumin 4.3 g/dL      Total Bilirubin 0.69 mg/dL      eGFR 118 ml/min/1.73sq m     Narrative:      National Kidney Disease Foundation guidelines for Chronic Kidney Disease (CKD):     Stage 1 with normal or high GFR (GFR > 90 mL/min/1.73 square meters)    Stage 2 Mild CKD (GFR = 60-89 mL/min/1.73 square meters)    Stage 3A Moderate CKD (GFR = 45-59 mL/min/1.73 square meters)    Stage 3B Moderate CKD (GFR = 30-44 mL/min/1.73 square meters)    Stage 4 Severe CKD (GFR = 15-29 mL/min/1.73 square meters)    Stage 5 End Stage CKD (GFR <15 mL/min/1.73 square meters)  Note: GFR calculation is accurate only with a steady state creatinine    CBC and differential [[id number]]  (Abnormal) Collected: 11/06/24 1742    Lab Status: Final result Specimen: Blood from Arm, Right Updated: 11/06/24 1748     WBC 13.74 Thousand/uL      RBC 4.78 Million/uL      Hemoglobin 13.8 g/dL      Hematocrit 40.8 %      MCV 85 fL      MCH 28.9 pg      MCHC 33.8 g/dL      RDW 12.6 %      MPV 8.8 fL      Platelets 364 Thousands/uL      nRBC 0 /100 WBCs      Segmented % 68 %      Immature Grans % 0 %      Lymphocytes % 24 %      Monocytes % 7 %      Eosinophils Relative 1 %      Basophils Relative 0 %      Absolute Neutrophils 9.23 Thousands/µL      Absolute Immature Grans 0.05 Thousand/uL      Absolute Lymphocytes 3.30 Thousands/µL      Absolute Monocytes 1.00 Thousand/µL      Eosinophils Absolute 0.13 Thousand/µL      Basophils  Absolute 0.03 Thousands/µL             CT abdomen pelvis with contrast   Final Interpretation by Marko Galindo MD (11/06 1933)      Colonic diverticulosis with wall thickening and pericolonic inflammation involving the distal descending colon as seen on image 2/124. No evidence of perforation or abscess.      Small hiatal hernia.         Workstation performed: [id number]             Procedures    ED Medication and Procedure Management   Prior to Admission Medications   Prescriptions Last Dose Informant Patient Reported? Taking?   Adalimumab (Humira Pen) 40 MG/0.8ML PNKT  Self No No   Sig: Inject 0.8 mL (40 mg total) under the skin once a week Takes on Monday   Adalimumab 40 MG/0.4ML PNKT   No No   Sig: Inject 40 mg under the skin every 14 (fourteen) days   Humira, 2 Pen, 40 MG/0.4ML AJKT   Yes No   budesonide (ENTOCORT EC) 3 MG capsule   No No   Sig: TAKE 3 CAPSULES DAILY FOR 1 MONTH, 2CAPS DAILY FOR 2 WEEKS, THEN TAKE 1 CAPSULE DAILY UNTIL FINISHED   dicyclomine (BENTYL) 20 mg tablet   No No   Sig: Take 1 tablet (20 mg total) by mouth 3 (three) times a day as needed (abdominal pain)   pantoprazole (PROTONIX) 40 mg tablet   No No   Sig: Take 1 tablet (40 mg total) by mouth daily      Facility-Administered Medications: None     Discharge Medication List as of 11/6/2024  7:43 PM        CONTINUE these medications which have NOT CHANGED    Details   Adalimumab (Humira Pen) 40 MG/0.8ML PNKT Inject 0.8 mL (40 mg total) under the skin once a week Takes on Monday, Starting Wed 10/18/2023, Normal      Adalimumab 40 MG/0.4ML PNKT Inject 40 mg under the skin every 14 (fourteen) days, Starting Wed 10/2/2024, Normal      budesonide (ENTOCORT EC) 3 MG capsule TAKE 3 CAPSULES DAILY FOR 1 MONTH, 2CAPS DAILY FOR 2 WEEKS, THEN TAKE 1 CAPSULE DAILY UNTIL FINISHED, Normal      dicyclomine (BENTYL) 20 mg tablet Take 1 tablet (20 mg total) by mouth 3 (three) times a day as needed (abdominal pain), Starting Tue 4/16/2024, Normal       Humira, 2 Pen, 40 MG/0.4ML AJKT Historical Med      pantoprazole (PROTONIX) 40 mg tablet Take 1 tablet (40 mg total) by mouth daily, Starting Tue 4/16/2024, Normal           No discharge procedures on file.  ED SEPSIS DOCUMENTATION   Time reflects when diagnosis was documented in both MDM as applicable and the Disposition within this note       Time User Action Codes Description Comment    11/6/2024  7:43 PM Getachew Kenny Add [K52.9] Colitis                  Getachew Kenny MD  11/06/24 1953

## 2024-11-06 NOTE — TELEPHONE ENCOUNTER
I spoke with his mother, results and recommendations were given.  She stated she wasn't surprised because Getachew has not been feeling well. He is currently at work but will take him to the hospital when she picks him up after work.    She wondered what will happen with medications moving forward , I let her know I did npt have an answer for that but to focus on this immediate concern first.

## 2024-11-08 ENCOUNTER — OFFICE VISIT (OUTPATIENT)
Dept: GASTROENTEROLOGY | Facility: CLINIC | Age: 34
End: 2024-11-08
Payer: COMMERCIAL

## 2024-11-08 ENCOUNTER — TELEPHONE (OUTPATIENT)
Dept: GASTROENTEROLOGY | Facility: CLINIC | Age: 34
End: 2024-11-08

## 2024-11-08 VITALS
BODY MASS INDEX: 38.24 KG/M2 | SYSTOLIC BLOOD PRESSURE: 122 MMHG | OXYGEN SATURATION: 97 % | HEART RATE: 76 BPM | DIASTOLIC BLOOD PRESSURE: 82 MMHG | HEIGHT: 62 IN | RESPIRATION RATE: 18 BRPM | WEIGHT: 207.8 LBS | TEMPERATURE: 98 F

## 2024-11-08 DIAGNOSIS — K50.018 CROHN'S DISEASE OF ILEUM, OTHER COMPLICATION (HCC): Primary | ICD-10-CM

## 2024-11-08 PROCEDURE — 99213 OFFICE O/P EST LOW 20 MIN: CPT | Performed by: PHYSICIAN ASSISTANT

## 2024-11-08 RX ORDER — METRONIDAZOLE 500 MG/1
500 TABLET ORAL EVERY 8 HOURS SCHEDULED
Qty: 21 TABLET | Refills: 0 | Status: SHIPPED | OUTPATIENT
Start: 2024-11-08 | End: 2024-11-15

## 2024-11-08 RX ORDER — PREDNISONE 10 MG/1
TABLET ORAL
Qty: 100 TABLET | Refills: 0 | Status: SHIPPED | OUTPATIENT
Start: 2024-11-08

## 2024-11-08 RX ORDER — DICYCLOMINE HCL 20 MG
TABLET ORAL
Qty: 20 TABLET | Refills: 3 | Status: SHIPPED | OUTPATIENT
Start: 2024-11-08

## 2024-11-08 NOTE — PROGRESS NOTES
[facility]  Getachew Kaye 34 y.o. male MRN: [id number]       CC: Follow-up    HPI: Getachew is a 34-year-old male with history of Crohn's disease of the small bowel previously on Humira weekly due to low drug level in 2017.  However, due to insurance, it was recommended that he take Humira every other week.  Unfortunately, the patient reports that he did not have medication for a full month and then received news that he cannot continue the medication every week as he was doing for several years while in remission.  Repeat blood work performed this week revealed CRP of 90 and white count of 17,000.  Patient was advised to go to the emergency room. CT in the emergency room in November as follows:Colonic diverticulosis with wall thickening and pericolonic inflammation involving the distal descending colon as seen on image 2/124. No evidence of perforation or abscess. No bowel obstruction.     Denies diarrhea or signs of GI bleeding.  Mostly having constipation this week with severe lower abdominal pain.  Fortunately his abdominal pain is more manageable.  He is accompanied by his mother today who also has Crohn's disease.    Last colonoscopy was performed in April 2024 with Dr. Hsu.  Mucosa appeared normal up to the terminal ileum.  Diverticulosis was noted.  Review of Systems:    CONSTITUTIONAL: Denies any fever, chills, or rigors. Good appetite, and no recent weight loss.  HEENT: No earache or tinnitus. Denies hearing loss or visual disturbances.  CARDIOVASCULAR: No chest pain or palpitations.   RESPIRATORY: Denies any cough, hemoptysis, shortness of breath or dyspnea on exertion.  GASTROINTESTINAL: As noted in the History of Present Illness.   GENITOURINARY: No problems with urination. Denies any hematuria or dysuria.  NEUROLOGIC: No dizziness or vertigo, denies headaches.   MUSCULOSKELETAL: Denies any muscle or joint pain.   SKIN: Denies skin rashes or itching.   ENDOCRINE: Denies excessive  thirst. Denies intolerance to heat or cold.  PSYCHOSOCIAL: Denies depression or anxiety. Denies any recent memory loss.       Current Outpatient Medications   Medication Sig Dispense Refill    Adalimumab (Humira Pen) 40 MG/0.8ML PNKT Inject 0.8 mL (40 mg total) under the skin once a week Takes on Monday 4 each 11    Adalimumab 40 MG/0.4ML PNKT Inject 40 mg under the skin every 14 (fourteen) days 2 each 11    dicyclomine (BENTYL) 20 mg tablet Take 1 tablet (20 mg total) by mouth 3 (three) times a day as needed (abdominal pain) 60 tablet 0    dicyclomine (BENTYL) 20 mg tablet Every 6 hours as needed for abdominal cramping 20 tablet 3    Humira, 2 Pen, 40 MG/0.4ML AJKT       pantoprazole (PROTONIX) 40 mg tablet Take 1 tablet (40 mg total) by mouth daily 180 tablet 3    predniSONE 10 mg tablet Take 4 tablets daily for 2 weeks, then 3 tablets daily for 1 week, then 2 tablets daily for 1 week, then 1 tablet daily until script is complete 100 tablet 0     No current facility-administered medications for this visit.     Past Medical History:   Diagnosis Date    Bronchitis     CPAP (continuous positive airway pressure) dependence     Crohn's disease (HCC)     GERD (gastroesophageal reflux disease)     H/O angina pectoris     Hyperlipidemia     Sleep apnea      Past Surgical History:   Procedure Laterality Date    CARPAL TUNNEL RELEASE      CHOLECYSTECTOMY      FOOT FUSION Right     pin    HERNIA REPAIR      INCISION AND DRAINAGE OF WOUND Right 09/30/2022    Procedure: INCISION AND DRAINAGE (I&D) RIGHT GROIN ABSCESS;  Surgeon: Josafat Ingram MD;  Location: [facility];  Service: General    [place] ESOPHAGOGASTRODUODENOSCOPY TRANSORAL DIAGNOSTIC N/A 04/20/2017    Procedure: EGD AND COLONOSCOPY;  Surgeon: Venkata Hsu III, MD;  Location: [facility];  Service: Gastroenterology    SHOULDER SURGERY Right      Social History     Socioeconomic History    Marital status: Single     Spouse name: None    Number of children: None    Years  "of education: None    Highest education level: None   Occupational History    None   Tobacco Use    Smoking status: Never    Smokeless tobacco: Never   Vaping Use    Vaping status: Never Used   Substance and Sexual Activity    Alcohol use: No    Drug use: Never    Sexual activity: None   Other Topics Concern    None   Social History Narrative    None     Social Determinants of Health     Financial Resource Strain: Not on file   Food Insecurity: Not on file   Transportation Needs: Not on file   Physical Activity: Not on file   Stress: Not on file   Social Connections: Not on file   Intimate Partner Violence: Not on file   Housing Stability: Not on file     Family History   Problem Relation Age of Onset    Diabetes Mother             PHYSICAL EXAM:    Vitals:    11/08/24 1123   BP: 122/82   BP Location: Left arm   Patient Position: Sitting   Cuff Size: Standard   Pulse: 76   Resp: 18   Temp: 98 °F (36.7 °C)   TempSrc: Tympanic   SpO2: 97%   Weight: 94.3 kg (207 lb 12.8 oz)   Height: 5' 2\" (1.575 m)     General Appearance:   Alert and oriented x 3. Cooperative, and in no respiratory distress   HEENT:   Normocephalic, atraumatic, anicteric.     Neck:  Supple, symmetrical, trachea midline   Lungs:   Clear to auscultation bilaterally    Heart::   Regular rate and rhythm   Abdomen:   Soft, non-tender, non-distended; normal bowel sounds; no masses, no organomegaly    Genitalia:   Deferred    Rectal:   Deferred    Extremities:  No cyanosis, clubbing or edema    Pulses:  2+ and symmetric all extremities    Skin:  Skin color, texture, turgor normal, no rashes or lesions    Lymph nodes:  No palpable cervical or supraclavicular lymphadenopathy        Lab Results:   Results from last 6 Months   Lab Units 11/06/24  1742   WBC Thousand/uL 13.74*   HEMOGLOBIN g/dL 13.8   HEMATOCRIT % 40.8   PLATELETS Thousands/uL 364   SEGS PCT % 68   LYMPHO PCT % 24   MONO PCT % 7   EOS PCT % 1     Results from last 6 Months   Lab Units " "11/06/24  1742   POTASSIUM mmol/L 4.5   CHLORIDE mmol/L 101   CO2 mmol/L 28   BUN mg/dL 21   CREATININE mg/dL 0.76   CALCIUM mg/dL 9.7   ALK PHOS U/L 46   ALT U/L 30   AST U/L 18               Imaging Studies:   CT abdomen pelvis with contrast    Result Date: 11/6/2024  Impression: Colonic diverticulosis with wall thickening and pericolonic inflammation involving the distal descending colon as seen on image 2/124. No evidence of perforation or abscess. Small hiatal hernia. Workstation performed: [id number]       ASSESSMENT and PLAN:      1) Crohn's disease of the small bowel, colitis on imaging - Denies diarrhea, in fact constipated.  Patient was in remission while on Humira weekly.  Unfortunately his insurance company has denied several appeals to continue his Humira every week as he was for several years even despite submitting medical necessity.  Discussed with Dr. Hsu who has known the patient for several years now.  Advised to switch to Skyrizi.  - He will take his last dose of Humira available at home  - Next TB QuantiFERON gold and hepatitis testing in April 2025  - Switch from budesonide to prednisone due to lack of improvement on budesonide  - Flagyl course  - Discussed with Dr. Hsu, given insurance limits, will switch to Skyrizi. Back up plan would be Stelara. Risks and benefits reviewed.        Follow up in 1 month.      Portions of the record may have been created with voice recognition software.  Occasional wrong word or \"sound a like\" substitutions may have occurred due to the inherent limitations of voice recognition software.  Read the chart carefully and recognize, using context, where substitutions have occurred.  "

## 2024-11-08 NOTE — TELEPHONE ENCOUNTER
Would you please submit an urgent authorization for Crohn's induction of Skyrizi 600mg IV at week 0, 4, and 8. I'll will call patient next week for education on the medication. Thank you!

## 2024-11-08 NOTE — TELEPHONE ENCOUNTER
Hi team, we need an urgent approval for [place]. Discussed with Dr. Hsu.  Unfortunately due to insurance, patient was unable to stay on Humira every week and he has a flareup ongoing.  Back up plan is Stelara, thank you!

## 2024-11-11 NOTE — TELEPHONE ENCOUNTER
Primary insurance processes auths through [employer]. Called [employer] at [phone] and spoke with Erendira. Auth was submitted for Skyrizi 600mg IV on weeks 0,4 and 8 at [facility].     Pending auth #: [id number]    Clinicals were faxed to [phone].

## 2024-11-12 NOTE — TELEPHONE ENCOUNTER
[employer] calling.  State the dose that is ordered is above what the plan will allow.  Plan will only allow for 600 mg Q 28 days for 3 doses.      [phone]

## 2024-11-13 NOTE — TELEPHONE ENCOUNTER
Called insurance back and spoke with [employer], a clinical reviewer. Clarified dose is 600mg Q 28 days for 3 doses. She stated I will receive a fax by end of day with determination

## 2024-11-14 DIAGNOSIS — K50.018 CROHN'S DISEASE OF ILEUM, OTHER COMPLICATION (HCC): Primary | ICD-10-CM

## 2024-11-14 RX ORDER — METHYLPREDNISOLONE SODIUM SUCCINATE 40 MG/ML
40 INJECTION, POWDER, LYOPHILIZED, FOR SOLUTION INTRAMUSCULAR; INTRAVENOUS ONCE
OUTPATIENT
Start: 2024-11-25

## 2024-11-14 RX ORDER — DEXTROSE MONOHYDRATE 50 MG/ML
20 INJECTION, SOLUTION INTRAVENOUS ONCE
OUTPATIENT
Start: 2024-11-25

## 2024-11-14 RX ORDER — DIPHENHYDRAMINE HCL 25 MG
25 TABLET ORAL ONCE
OUTPATIENT
Start: 2024-11-25

## 2024-11-14 NOTE — TELEPHONE ENCOUNTER
Medication: Skyrizi   Directions: Infuse 600mg Q 4 weeks x 3 doses  Pt weight: 94.3kg  Quantity: 600mg  Day Supply: 28  Insurance: [employer] [place]  How Prior Auth was submitted: Verbal  Authorization Date range: 11/11/2024 - 1/12/2025  Authorization Number: [id number]  Pharmacy that fills med: [facility]  Infusion Center: [facility]    Letter in media

## 2024-11-14 NOTE — TELEPHONE ENCOUNTER
Spoke with patients mom Delmi. Provided appointment information.     She stated pt has 1 shot of Humira left and is due on Monday 11/18/2024. She is asking if he should take that shot or not.     Scheduled for first skyrizi 1 week later on 11/25

## 2024-11-21 ENCOUNTER — TELEPHONE (OUTPATIENT)
Dept: INFUSION CENTER | Facility: CLINIC | Age: 34
End: 2024-11-21

## 2024-11-21 NOTE — TELEPHONE ENCOUNTER
Left voicemail patient regarding upcoming first appointment, verified appointment date/time, discussed infusion centers policies and procedures, callback # left for any questions

## 2024-11-25 ENCOUNTER — HOSPITAL ENCOUNTER (OUTPATIENT)
Dept: INFUSION CENTER | Facility: CLINIC | Age: 34
Discharge: HOME | End: 2024-11-25
Payer: COMMERCIAL

## 2024-11-25 VITALS
SYSTOLIC BLOOD PRESSURE: 135 MMHG | DIASTOLIC BLOOD PRESSURE: 80 MMHG | RESPIRATION RATE: 16 BRPM | HEART RATE: 92 BPM | TEMPERATURE: 97.6 F

## 2024-11-25 DIAGNOSIS — K50.018 CROHN'S DISEASE OF ILEUM, OTHER COMPLICATION (HCC): Primary | ICD-10-CM

## 2024-11-25 PROCEDURE — 96375 TX/PRO/DX INJ NEW DRUG ADDON: CPT

## 2024-11-25 PROCEDURE — 96365 THER/PROPH/DIAG IV INF INIT: CPT

## 2024-11-25 RX ORDER — METHYLPREDNISOLONE SODIUM SUCCINATE 40 MG/ML
40 INJECTION, POWDER, LYOPHILIZED, FOR SOLUTION INTRAMUSCULAR; INTRAVENOUS ONCE
Status: COMPLETED | OUTPATIENT
Start: 2024-11-25 | End: 2024-11-25

## 2024-11-25 RX ORDER — DIPHENHYDRAMINE HCL 25 MG
25 TABLET ORAL ONCE
Status: COMPLETED | OUTPATIENT
Start: 2024-11-25 | End: 2024-11-25

## 2024-11-25 RX ORDER — DIPHENHYDRAMINE HCL 25 MG
25 TABLET ORAL ONCE
OUTPATIENT
Start: 2024-12-23

## 2024-11-25 RX ORDER — DEXTROSE MONOHYDRATE 50 MG/ML
20 INJECTION, SOLUTION INTRAVENOUS ONCE
Status: COMPLETED | OUTPATIENT
Start: 2024-11-25 | End: 2024-11-25

## 2024-11-25 RX ORDER — DEXTROSE MONOHYDRATE 50 MG/ML
20 INJECTION, SOLUTION INTRAVENOUS ONCE
OUTPATIENT
Start: 2024-12-23

## 2024-11-25 RX ORDER — METHYLPREDNISOLONE SODIUM SUCCINATE 40 MG/ML
40 INJECTION, POWDER, LYOPHILIZED, FOR SOLUTION INTRAMUSCULAR; INTRAVENOUS ONCE
OUTPATIENT
Start: 2024-12-23

## 2024-11-25 RX ADMIN — DIPHENHYDRAMINE HYDROCHLORIDE 25 MG: 25 TABLET ORAL at 14:35

## 2024-11-25 RX ADMIN — METHYLPREDNISOLONE SODIUM SUCCINATE 40 MG: 40 INJECTION, POWDER, FOR SOLUTION INTRAMUSCULAR; INTRAVENOUS at 14:37

## 2024-11-25 RX ADMIN — DEXTROSE 20 ML/HR: 5 SOLUTION INTRAVENOUS at 14:39

## 2024-11-25 RX ADMIN — DEXTROSE 600 MG: 5 SOLUTION INTRAVENOUS at 15:14

## 2024-11-25 NOTE — PROGRESS NOTES
Patient presents for Skyrizi, offers no complaints. Denies recent illness. Tolerated infusion without complications. Patient aware of next appointment on 12/23 at 2:30 pm. PIV removed. AVS declined.

## 2024-12-13 ENCOUNTER — HOSPITAL ENCOUNTER (OUTPATIENT)
Dept: CT IMAGING | Facility: CLINIC | Age: 34
Discharge: HOME | End: 2024-12-13
Payer: COMMERCIAL

## 2024-12-13 ENCOUNTER — APPOINTMENT (OUTPATIENT)
Dept: LAB | Facility: HOSPITAL | Age: 34
End: 2024-12-13
Payer: COMMERCIAL

## 2024-12-13 ENCOUNTER — OFFICE VISIT (OUTPATIENT)
Dept: GASTROENTEROLOGY | Facility: CLINIC | Age: 34
End: 2024-12-13
Payer: COMMERCIAL

## 2024-12-13 ENCOUNTER — RESULTS FOLLOW-UP (OUTPATIENT)
Dept: GASTROENTEROLOGY | Facility: CLINIC | Age: 34
End: 2024-12-13

## 2024-12-13 VITALS
BODY MASS INDEX: 37.54 KG/M2 | WEIGHT: 204 LBS | HEART RATE: 81 BPM | TEMPERATURE: 98.7 F | OXYGEN SATURATION: 99 % | HEIGHT: 62 IN | SYSTOLIC BLOOD PRESSURE: 134 MMHG | DIASTOLIC BLOOD PRESSURE: 74 MMHG

## 2024-12-13 DIAGNOSIS — M54.6 ACUTE THORACIC BACK PAIN, UNSPECIFIED BACK PAIN LATERALITY: ICD-10-CM

## 2024-12-13 DIAGNOSIS — R11.2 NAUSEA AND VOMITING, UNSPECIFIED VOMITING TYPE: ICD-10-CM

## 2024-12-13 DIAGNOSIS — R31.9 HEMATURIA, UNSPECIFIED TYPE: ICD-10-CM

## 2024-12-13 DIAGNOSIS — K50.018 CROHN'S DISEASE OF ILEUM, OTHER COMPLICATION (HCC): ICD-10-CM

## 2024-12-13 DIAGNOSIS — R30.0 DYSURIA: ICD-10-CM

## 2024-12-13 DIAGNOSIS — K50.018 CROHN'S DISEASE OF ILEUM, OTHER COMPLICATION (HCC): Primary | ICD-10-CM

## 2024-12-13 DIAGNOSIS — K21.9 GASTROESOPHAGEAL REFLUX DISEASE: ICD-10-CM

## 2024-12-13 LAB
ALBUMIN SERPL BCG-MCNC: 4.2 G/DL (ref 3.5–5)
ALP SERPL-CCNC: 45 U/L (ref 34–104)
ALT SERPL W P-5'-P-CCNC: 29 U/L (ref 7–52)
ANION GAP SERPL CALCULATED.3IONS-SCNC: 3 MMOL/L (ref 4–13)
AST SERPL W P-5'-P-CCNC: 15 U/L (ref 13–39)
BACTERIA UR QL AUTO: ABNORMAL /HPF
BILIRUB SERPL-MCNC: 0.65 MG/DL (ref 0.2–1)
BILIRUB UR QL STRIP: NEGATIVE
BUN SERPL-MCNC: 22 MG/DL (ref 5–25)
CALCIUM SERPL-MCNC: 9.6 MG/DL (ref 8.4–10.2)
CHLORIDE SERPL-SCNC: 104 MMOL/L (ref 96–108)
CLARITY UR: CLEAR
CO2 SERPL-SCNC: 33 MMOL/L (ref 21–32)
COLOR UR: ABNORMAL
CREAT SERPL-MCNC: 0.8 MG/DL (ref 0.6–1.3)
CRP SERPL QL: 10.5 MG/L
ERYTHROCYTE [DISTWIDTH] IN BLOOD BY AUTOMATED COUNT: 13.4 % (ref 11.6–15.1)
GFR SERPL CREATININE-BSD FRML MDRD: 115 ML/MIN/1.73SQ M
GLUCOSE SERPL-MCNC: 83 MG/DL (ref 65–140)
GLUCOSE UR STRIP-MCNC: NEGATIVE MG/DL
HCT VFR BLD AUTO: 42.7 % (ref 36.5–49.3)
HGB BLD-MCNC: 14 G/DL (ref 12–17)
HGB UR QL STRIP.AUTO: ABNORMAL
KETONES UR STRIP-MCNC: NEGATIVE MG/DL
LEUKOCYTE ESTERASE UR QL STRIP: NEGATIVE
MCH RBC QN AUTO: 28.9 PG (ref 26.8–34.3)
MCHC RBC AUTO-ENTMCNC: 32.8 G/DL (ref 31.4–37.4)
MCV RBC AUTO: 88 FL (ref 82–98)
MUCOUS THREADS UR QL AUTO: ABNORMAL
NITRITE UR QL STRIP: NEGATIVE
NON-SQ EPI CELLS URNS QL MICRO: ABNORMAL /HPF
PH UR STRIP.AUTO: 6 [PH]
PLATELET # BLD AUTO: 267 THOUSANDS/UL (ref 149–390)
PMV BLD AUTO: 9.5 FL (ref 8.9–12.7)
POTASSIUM SERPL-SCNC: 4.5 MMOL/L (ref 3.5–5.3)
PROT SERPL-MCNC: 7 G/DL (ref 6.4–8.4)
PROT UR STRIP-MCNC: NEGATIVE MG/DL
RBC # BLD AUTO: 4.84 MILLION/UL (ref 3.88–5.62)
RBC #/AREA URNS AUTO: ABNORMAL /HPF
SODIUM SERPL-SCNC: 140 MMOL/L (ref 135–147)
SP GR UR STRIP.AUTO: 1.03 (ref 1–1.03)
UROBILINOGEN UR STRIP-ACNC: <2 MG/DL
WBC # BLD AUTO: 9.92 THOUSAND/UL (ref 4.31–10.16)
WBC #/AREA URNS AUTO: ABNORMAL /HPF

## 2024-12-13 PROCEDURE — 80053 COMPREHEN METABOLIC PANEL: CPT

## 2024-12-13 PROCEDURE — 86140 C-REACTIVE PROTEIN: CPT

## 2024-12-13 PROCEDURE — 74176 CT ABD & PELVIS W/O CONTRAST: CPT

## 2024-12-13 PROCEDURE — 85027 COMPLETE CBC AUTOMATED: CPT

## 2024-12-13 PROCEDURE — 81001 URINALYSIS AUTO W/SCOPE: CPT

## 2024-12-13 PROCEDURE — 87086 URINE CULTURE/COLONY COUNT: CPT

## 2024-12-13 PROCEDURE — 36415 COLL VENOUS BLD VENIPUNCTURE: CPT

## 2024-12-13 PROCEDURE — 99214 OFFICE O/P EST MOD 30 MIN: CPT | Performed by: PHYSICIAN ASSISTANT

## 2024-12-13 RX ORDER — PANTOPRAZOLE SODIUM 40 MG/1
40 TABLET, DELAYED RELEASE ORAL DAILY
Qty: 90 TABLET | Refills: 3 | Status: SHIPPED | OUTPATIENT
Start: 2024-12-13

## 2024-12-13 NOTE — TELEPHONE ENCOUNTER
----- Message from Vikki Matthews PA-C sent at 12/13/2024 12:57 PM EST -----  Please let patient know that he has had significant improvement in his inflammatory markers from 90 down to 10.  His urine does show blood, and I am suspicious that he may have a kidney stone.  I put an order for a stat CT of his kidney to investigate.  This is without contrast.  Please have them scheduled this, thank you

## 2024-12-13 NOTE — TELEPHONE ENCOUNTER
Patient returned call to office. Gave him message as per Vikki MOREIRA. Gave patient central scheduling's  phone # so he can call and schedule the STAT CT Scan  as per Vikki LOMBARDO Patient voiced understanding and had no further questions or concerns

## 2024-12-13 NOTE — ASSESSMENT & PLAN NOTE
Unfortunately lost response quickly to Humira when he was forced by his insurance company to go from weekly dosing to every other week despite history of antibody formation.  Patient completed 1 out of 3 induction infusions for Skyrizi.  Following this he will be on subcu injection.  Patient reports improvement abdominal pain compared to 1 month ago.  He is almost done with his steroid taper.  Last CRP 91-month ago.  -We will recheck labs today, patient's mother will be able to take him after today's visit  -If his CRP is not improving, may need an admission to the hospital for IV steroids  -If his CRP is improving, may need to plan for EGD given his active vomiting symptoms.  It has been greater than 5 years since his last endoscopy.  -Continue Skyrizi  -Patient and his mother agree with plan

## 2024-12-13 NOTE — PROGRESS NOTES
Name: Getachew Kaye      : 1990      MRN: [id number]  Encounter Provider: Vikki Matthews PA-C  Encounter Date: 2024   Encounter department: [facility] GASTROENTEROLOG[facility] SPECIAL[facility] [place]  :  Assessment & Plan  Crohn's disease of ileum, other complication (HCC)  Unfortunately lost response quickly to Humira when he was forced by his insurance company to go from weekly dosing to every other week despite history of antibody formation.  Patient completed 1 out of 3 induction infusions for Skyrizi.  Following this he will be on subcu injection.  Patient reports improvement abdominal pain compared to 1 month ago.  He is almost done with his steroid taper.  Last CRP 91-month ago.  -We will recheck labs today, patient's mother will be able to take him after today's visit  -If his CRP is not improving, may need an admission to the hospital for IV steroids  -If his CRP is improving, may need to plan for EGD given his active vomiting symptoms.  It has been greater than 5 years since his last endoscopy.  -Continue Skyrizi  -Patient and his mother agree with plan  Gastroesophageal reflux disease  Patient admits that has not been taking his pantoprazole.  We will represcribe this.  Dysuria  Check urinalysis and culture.  Nausea and vomiting, unspecified vomiting type  Potential EGD to investigate pending above.      History of Present Illness   HPI  Getachew Kaye is a 34 y.o. male with history of Crohn's disease of the small bowel previously on Humira weekly however due to insurance was recommended that the patient go on Humira every other week.  Unfortunately, patient lost response to medication.  Since then was switched to Skyrizi.  He presents today for follow-up accompanied by his mother.  Patient underwent his first induction infusion on , the next one will be due near Anupam time.    Patient reports that symptom wise, his abdominal pain is improved.  When he went to the emergency room 1  "month ago he had 10 out of 10 pain.  He rates his pain a 6 out of 10.  Denies signs or GI bleeding.  What bothers him mostly is vomiting after meals.  Patient reports that he has been experiencing back pain.    Last colonoscopy was performed in April 2024 with Dr. Hsu. Mucosa appeared normal up to the terminal ileum. Diverticulosis was noted.  His last endoscopy was in 2017.        Review of Systems   Constitutional:  Negative for chills and fever.   HENT:  Negative for ear pain and sore throat.    Eyes:  Negative for pain and visual disturbance.   Respiratory:  Negative for cough and shortness of breath.    Cardiovascular:  Negative for chest pain and palpitations.   Gastrointestinal:  Negative for abdominal pain and vomiting.   Genitourinary:  Positive for dysuria. Negative for hematuria.   Musculoskeletal:  Positive for back pain. Negative for arthralgias.   Skin:  Negative for color change and rash.   Neurological:  Negative for seizures and syncope.   All other systems reviewed and are negative.         Objective   Ht 5' 2\" (1.575 m)   BMI 38.01 kg/m²            "

## 2024-12-14 LAB — BACTERIA UR CULT: NORMAL

## 2024-12-16 ENCOUNTER — TELEPHONE (OUTPATIENT)
Age: 34
End: 2024-12-16

## 2024-12-16 ENCOUNTER — RESULTS FOLLOW-UP (OUTPATIENT)
Dept: GASTROENTEROLOGY | Facility: CLINIC | Age: 34
End: 2024-12-16

## 2024-12-16 DIAGNOSIS — R31.9 HEMATURIA, UNSPECIFIED TYPE: Primary | ICD-10-CM

## 2024-12-16 NOTE — TELEPHONE ENCOUNTER
Called and spoke to patients mom. Gave her message as per Vikki Banks Patient has a appointment with urology in January. She voiced understanding and had no further questions or concerns

## 2024-12-16 NOTE — TELEPHONE ENCOUNTER
New Patient    What is the reason for the patient’s appointment?: Patients mom calling to schedule an appt because she received a text that he was referred to us by his GI doctor. Patient had a UA done that showed microscopic hematuria and they were suspicious for a kidney stone which is why he went for a CT.     What office location does the patient prefer?: [place]    Does patient have Imaging/Lab Results: CT renal stone done on 12/13    IMPRESSION:     1.  No evidence of nephrolithiasis or hydronephrosis.  2.  Minimal residual pericolonic stranding related to the prior episode of diverticulitis in November 2024.       Have patient records been requested?:  If No, are the records showing in Epic:       INSURANCE:   Do we accept the patient's insurance or is the patient Self-Pay?: Yes    Insurance Provider: [employer]  Plan Type/Number:   Member ID#:       HISTORY:   Has the patient had any previous Urologist(s)?: Saw Dr. Cantrell over 10 years ago     Was the patient seen in the ED?: No    Patient scheduled with Stacie on 1/24/25 at  940

## 2024-12-16 NOTE — TELEPHONE ENCOUNTER
----- Message from Vikki Matthews PA-C sent at 12/16/2024  7:54 AM EST -----  Please let patient know that his CT did not show any obvious kidney stones, but the patient may have passed 1 or there may be another reason why he has blood in his urine.  I put in a referral for urology.  Thank you

## 2024-12-23 ENCOUNTER — HOSPITAL ENCOUNTER (OUTPATIENT)
Dept: INFUSION CENTER | Facility: CLINIC | Age: 34
Discharge: HOME | End: 2024-12-23
Payer: COMMERCIAL

## 2024-12-23 VITALS
HEART RATE: 72 BPM | RESPIRATION RATE: 18 BRPM | DIASTOLIC BLOOD PRESSURE: 80 MMHG | TEMPERATURE: 97.7 F | SYSTOLIC BLOOD PRESSURE: 144 MMHG

## 2024-12-23 DIAGNOSIS — K50.018 CROHN'S DISEASE OF ILEUM, OTHER COMPLICATION (HCC): Primary | ICD-10-CM

## 2024-12-23 RX ORDER — DEXTROSE MONOHYDRATE 50 MG/ML
20 INJECTION, SOLUTION INTRAVENOUS ONCE
Status: COMPLETED | OUTPATIENT
Start: 2024-12-23 | End: 2024-12-23

## 2024-12-23 RX ORDER — DEXTROSE MONOHYDRATE 50 MG/ML
20 INJECTION, SOLUTION INTRAVENOUS ONCE
OUTPATIENT
Start: 2025-01-20

## 2024-12-23 RX ORDER — DIPHENHYDRAMINE HCL 25 MG
25 TABLET ORAL ONCE
OUTPATIENT
Start: 2025-01-20

## 2024-12-23 RX ORDER — METHYLPREDNISOLONE SODIUM SUCCINATE 40 MG/ML
40 INJECTION, POWDER, LYOPHILIZED, FOR SOLUTION INTRAMUSCULAR; INTRAVENOUS ONCE
OUTPATIENT
Start: 2025-01-20

## 2024-12-23 RX ORDER — DIPHENHYDRAMINE HCL 25 MG
25 TABLET ORAL ONCE
Status: COMPLETED | OUTPATIENT
Start: 2024-12-23 | End: 2024-12-23

## 2024-12-23 RX ORDER — METHYLPREDNISOLONE SODIUM SUCCINATE 40 MG/ML
40 INJECTION, POWDER, LYOPHILIZED, FOR SOLUTION INTRAMUSCULAR; INTRAVENOUS ONCE
Status: COMPLETED | OUTPATIENT
Start: 2024-12-23 | End: 2024-12-23

## 2024-12-23 RX ADMIN — DEXTROSE 600 MG: 5 SOLUTION INTRAVENOUS at 15:03

## 2024-12-23 RX ADMIN — DIPHENHYDRAMINE HYDROCHLORIDE 25 MG: 25 TABLET ORAL at 14:27

## 2024-12-23 RX ADMIN — METHYLPREDNISOLONE SODIUM SUCCINATE 40 MG: 40 INJECTION, POWDER, FOR SOLUTION INTRAMUSCULAR; INTRAVENOUS at 14:27

## 2024-12-23 RX ADMIN — DEXTROSE 20 ML/HR: 5 SOLUTION INTRAVENOUS at 15:01

## 2024-12-23 NOTE — PROGRESS NOTES
Pt to clinic for [place]. Pt offers no complaints today. Tolerated infusion without complications. Aware of next appointment on 1/20/25 at 1430. AVS declined. PIV removed.

## 2025-01-07 ENCOUNTER — TELEPHONE (OUTPATIENT)
Dept: GASTROENTEROLOGY | Facility: CLINIC | Age: 35
End: 2025-01-07

## 2025-01-14 ENCOUNTER — TELEPHONE (OUTPATIENT)
Age: 35
End: 2025-01-14

## 2025-01-14 NOTE — TELEPHONE ENCOUNTER
Reyna Woods MA  Good Afternoon-    Haider manriquez for Merced  on - he is scheduled for treatment on - He shall need a new PA    Thankyou :)

## 2025-01-15 NOTE — TELEPHONE ENCOUNTER
Called [facility] at [phone] and spoke with Shyla. Submitted verbal auth for 1 dose to cover DOS of 1/20/205.     Pending case #: [id number]    Clinicals faxed to [phone]

## 2025-01-17 ENCOUNTER — OFFICE VISIT (OUTPATIENT)
Dept: GASTROENTEROLOGY | Facility: CLINIC | Age: 35
End: 2025-01-17
Payer: COMMERCIAL

## 2025-01-17 VITALS
OXYGEN SATURATION: 99 % | BODY MASS INDEX: 37.73 KG/M2 | SYSTOLIC BLOOD PRESSURE: 118 MMHG | HEART RATE: 84 BPM | HEIGHT: 62 IN | DIASTOLIC BLOOD PRESSURE: 78 MMHG | WEIGHT: 205 LBS | TEMPERATURE: 99.6 F

## 2025-01-17 DIAGNOSIS — K21.9 GERD WITHOUT ESOPHAGITIS: ICD-10-CM

## 2025-01-17 DIAGNOSIS — R31.9 HEMATURIA, UNSPECIFIED TYPE: ICD-10-CM

## 2025-01-17 DIAGNOSIS — K50.018 CROHN'S DISEASE OF ILEUM, OTHER COMPLICATION (HCC): Primary | ICD-10-CM

## 2025-01-17 DIAGNOSIS — R11.11 VOMITING WITHOUT NAUSEA, UNSPECIFIED VOMITING TYPE: ICD-10-CM

## 2025-01-17 PROCEDURE — 99214 OFFICE O/P EST MOD 30 MIN: CPT | Performed by: PHYSICIAN ASSISTANT

## 2025-01-17 NOTE — PROGRESS NOTES
Name: Getachew Kaye      : 1990      MRN: [id number]  Encounter Provider: Vikki Matthews PA-C  Encounter Date: 2025   Encounter department: [facility] GASTROENTEROLOGY SPECIAL[facility] [place]  :  Assessment & Plan  Crohn's disease of ileum, other complication (HCC)  CRP has improved from 90 in November down to 10 when it was last drawn in December.  GERD without esophagitis  Patient still having intermittent vomiting, but much less compared to previous.  Patient is agreeable to EGD to investigate.  His last EGD was likely close to 10 years ago and Dr. Hsu was at his previous practice. I obtained informed consent from the patient. The risks/benefits/alternatives of the procedure were discussed with the patient. Risks included, but not limited to, infection, bleeding, perforation, injury to organs in the abdomen, missed lesion and incomplete procedure were discussed. Patient was agreeable and electronic consent was signed.    Hematuria, unspecified type  Because of hematuria, I sent him for a CT renal protocol, which did not reveal nephrolithiasis.  Patient may have passed a kidney stone?  Urology referral was placed.      History of Present Illness   HPI  Getachew Kaye is a 34 y.o. male with history of small bowel Crohn's disease currently on Skyrizi who presents for follow-up accompanied by his mother.  Patient was last seen by me in December.  At the time he was having abdominal pain, vomiting and back pain.  His urinalysis revealed hematuria, night sent him for a CT renal protocol, which fortunately did not reveal any gallstones.  Patient reports that he is starting to feel better.  He is going for his last Skyrizi induction dose on Monday.  Following this he will start injectable Skyrizi every 8 weeks.  Patient reports he has vomiting without nausea.    Last colonoscopy was performed in 2024 with Dr. Hsu. Mucosa appeared normal up to the terminal ileum. Diverticulosis was noted.  .    "      Review of Systems   Constitutional:  Negative for appetite change, chills, diaphoresis, fatigue and unexpected weight change.   HENT:  Negative for sore throat and trouble swallowing.    Eyes:  Negative for discharge and redness.   Respiratory:  Negative for cough, shortness of breath and wheezing.    Cardiovascular:  Negative for chest pain and palpitations.   Gastrointestinal:  Positive for vomiting. Negative for abdominal pain, anal bleeding, blood in stool, constipation, diarrhea, nausea and rectal pain.   Endocrine: Negative for cold intolerance and heat intolerance.   Musculoskeletal:  Negative for joint swelling and myalgias.   Skin:  Negative for pallor and rash.   Neurological:  Negative for dizziness, tremors, weakness, light-headedness, numbness and headaches.   Hematological:  Negative for adenopathy. Does not bruise/bleed easily.   Psychiatric/Behavioral:  Negative for behavioral problems, confusion, dysphoric mood and sleep disturbance. The patient is not nervous/anxious.           Objective   Pulse 84   Temp 99.6 °F (37.6 °C) (Tympanic)   Ht 5' 2\" (1.575 m)   Wt 93 kg (205 lb)   SpO2 99%   BMI 37.49 kg/m²      Physical Exam  Constitutional:       General: He is not in acute distress.     Appearance: He is well-developed. He is not diaphoretic.   HENT:      Head: Normocephalic and atraumatic.   Eyes:      General: No scleral icterus.     Conjunctiva/sclera: Conjunctivae normal.      Pupils: Pupils are equal, round, and reactive to light.   Neck:      Thyroid: No thyromegaly.      Trachea: No tracheal deviation.   Pulmonary:      Effort: Pulmonary effort is normal.   Abdominal:      General: There is no distension.      Palpations: Abdomen is soft. Abdomen is not rigid. There is no mass.      Tenderness: There is no abdominal tenderness. There is no guarding or rebound.   Musculoskeletal:      Cervical back: Neck supple.   Lymphadenopathy:      Cervical: No cervical adenopathy.   Skin:     " General: Skin is warm and dry.      Findings: No erythema or rash.   Neurological:      Mental Status: He is alert and oriented to person, place, and time.   Psychiatric:         Behavior: Behavior normal.

## 2025-01-17 NOTE — PATIENT INSTRUCTIONS
Scheduled date of EGD(as of today):2/7/25  Physician performing EGD:Shadi  Location of EGD:[place]  Instructions reviewed with patient by:Shanita PARK  Clearances:  none

## 2025-01-17 NOTE — H&P (VIEW-ONLY)
Name: Getachew Kaye      : 1990      MRN: [id number]  Encounter Provider: Vikki Matthews PA-C  Encounter Date: 2025   Encounter department: [facility] GASTROENTEROLOGY SPECIAL[facility] [place]  :  Assessment & Plan  Crohn's disease of ileum, other complication (HCC)  CRP has improved from 90 in November down to 10 when it was last drawn in December.  GERD without esophagitis  Patient still having intermittent vomiting, but much less compared to previous.  Patient is agreeable to EGD to investigate.  His last EGD was likely close to 10 years ago and Dr. Hsu was at his previous practice. I obtained informed consent from the patient. The risks/benefits/alternatives of the procedure were discussed with the patient. Risks included, but not limited to, infection, bleeding, perforation, injury to organs in the abdomen, missed lesion and incomplete procedure were discussed. Patient was agreeable and electronic consent was signed.    Hematuria, unspecified type  Because of hematuria, I sent him for a CT renal protocol, which did not reveal nephrolithiasis.  Patient may have passed a kidney stone?  Urology referral was placed.      History of Present Illness   HPI  Getachew Kaye is a 34 y.o. male with history of small bowel Crohn's disease currently on Skyrizi who presents for follow-up accompanied by his mother.  Patient was last seen by me in December.  At the time he was having abdominal pain, vomiting and back pain.  His urinalysis revealed hematuria, night sent him for a CT renal protocol, which fortunately did not reveal any gallstones.  Patient reports that he is starting to feel better.  He is going for his last Skyrizi induction dose on Monday.  Following this he will start injectable Skyrizi every 8 weeks.  Patient reports he has vomiting without nausea.    Last colonoscopy was performed in 2024 with Dr. Hsu. Mucosa appeared normal up to the terminal ileum. Diverticulosis was noted.  .    "      Review of Systems   Constitutional:  Negative for appetite change, chills, diaphoresis, fatigue and unexpected weight change.   HENT:  Negative for sore throat and trouble swallowing.    Eyes:  Negative for discharge and redness.   Respiratory:  Negative for cough, shortness of breath and wheezing.    Cardiovascular:  Negative for chest pain and palpitations.   Gastrointestinal:  Positive for vomiting. Negative for abdominal pain, anal bleeding, blood in stool, constipation, diarrhea, nausea and rectal pain.   Endocrine: Negative for cold intolerance and heat intolerance.   Musculoskeletal:  Negative for joint swelling and myalgias.   Skin:  Negative for pallor and rash.   Neurological:  Negative for dizziness, tremors, weakness, light-headedness, numbness and headaches.   Hematological:  Negative for adenopathy. Does not bruise/bleed easily.   Psychiatric/Behavioral:  Negative for behavioral problems, confusion, dysphoric mood and sleep disturbance. The patient is not nervous/anxious.           Objective   Pulse 84   Temp 99.6 °F (37.6 °C) (Tympanic)   Ht 5' 2\" (1.575 m)   Wt 93 kg (205 lb)   SpO2 99%   BMI 37.49 kg/m²      Physical Exam  Constitutional:       General: He is not in acute distress.     Appearance: He is well-developed. He is not diaphoretic.   HENT:      Head: Normocephalic and atraumatic.   Eyes:      General: No scleral icterus.     Conjunctiva/sclera: Conjunctivae normal.      Pupils: Pupils are equal, round, and reactive to light.   Neck:      Thyroid: No thyromegaly.      Trachea: No tracheal deviation.   Pulmonary:      Effort: Pulmonary effort is normal.   Abdominal:      General: There is no distension.      Palpations: Abdomen is soft. Abdomen is not rigid. There is no mass.      Tenderness: There is no abdominal tenderness. There is no guarding or rebound.   Musculoskeletal:      Cervical back: Neck supple.   Lymphadenopathy:      Cervical: No cervical adenopathy.   Skin:     " General: Skin is warm and dry.      Findings: No erythema or rash.   Neurological:      Mental Status: He is alert and oriented to person, place, and time.   Psychiatric:         Behavior: Behavior normal.

## 2025-01-17 NOTE — ASSESSMENT & PLAN NOTE
Patient still having intermittent vomiting, but much less compared to previous.  Patient is agreeable to EGD to investigate.  His last EGD was likely close to 10 years ago and Dr. Hsu was at his previous practice. I obtained informed consent from the patient. The risks/benefits/alternatives of the procedure were discussed with the patient. Risks included, but not limited to, infection, bleeding, perforation, injury to organs in the abdomen, missed lesion and incomplete procedure were discussed. Patient was agreeable and electronic consent was signed.

## 2025-01-17 NOTE — TELEPHONE ENCOUNTER
Medication: Skyrizi   Directions: Infuse 600mg on week 8  Pt weight: 92.5kg  Quantity: 600mg  Day Supply: 1 dose  Insurance: [facility]  How Prior Auth was submitted: Verbal  Authorization Date range: 1/20/2025 - 3/23/2025  Authorization Number: #[id number]  [facility]  Infusion Center: [place]  Patient aware of approval:

## 2025-01-20 ENCOUNTER — HOSPITAL ENCOUNTER (OUTPATIENT)
Dept: INFUSION CENTER | Facility: CLINIC | Age: 35
Discharge: HOME | End: 2025-01-20
Payer: COMMERCIAL

## 2025-01-20 VITALS
HEART RATE: 72 BPM | DIASTOLIC BLOOD PRESSURE: 70 MMHG | SYSTOLIC BLOOD PRESSURE: 131 MMHG | TEMPERATURE: 97.5 F | RESPIRATION RATE: 18 BRPM

## 2025-01-20 DIAGNOSIS — K50.018 CROHN'S DISEASE OF ILEUM, OTHER COMPLICATION (HCC): Primary | ICD-10-CM

## 2025-01-20 PROCEDURE — 96375 TX/PRO/DX INJ NEW DRUG ADDON: CPT

## 2025-01-20 PROCEDURE — 96365 THER/PROPH/DIAG IV INF INIT: CPT

## 2025-01-20 RX ORDER — METHYLPREDNISOLONE SODIUM SUCCINATE 40 MG/ML
40 INJECTION, POWDER, LYOPHILIZED, FOR SOLUTION INTRAMUSCULAR; INTRAVENOUS ONCE
Status: CANCELLED | OUTPATIENT
Start: 2025-01-20

## 2025-01-20 RX ORDER — DEXTROSE MONOHYDRATE 50 MG/ML
20 INJECTION, SOLUTION INTRAVENOUS ONCE
Status: CANCELLED | OUTPATIENT
Start: 2025-01-20

## 2025-01-20 RX ORDER — DEXTROSE MONOHYDRATE 50 MG/ML
20 INJECTION, SOLUTION INTRAVENOUS ONCE
Status: COMPLETED | OUTPATIENT
Start: 2025-01-20 | End: 2025-01-20

## 2025-01-20 RX ORDER — DIPHENHYDRAMINE HCL 25 MG
25 TABLET ORAL ONCE
Status: CANCELLED | OUTPATIENT
Start: 2025-01-20

## 2025-01-20 RX ORDER — DIPHENHYDRAMINE HCL 25 MG
25 TABLET ORAL ONCE
Status: COMPLETED | OUTPATIENT
Start: 2025-01-20 | End: 2025-01-20

## 2025-01-20 RX ORDER — METHYLPREDNISOLONE SODIUM SUCCINATE 40 MG/ML
40 INJECTION, POWDER, LYOPHILIZED, FOR SOLUTION INTRAMUSCULAR; INTRAVENOUS ONCE
Status: COMPLETED | OUTPATIENT
Start: 2025-01-20 | End: 2025-01-20

## 2025-01-20 RX ADMIN — DIPHENHYDRAMINE HYDROCHLORIDE 25 MG: 25 TABLET ORAL at 14:20

## 2025-01-20 RX ADMIN — DEXTROSE 20 ML/HR: 5 SOLUTION INTRAVENOUS at 14:23

## 2025-01-20 RX ADMIN — METHYLPREDNISOLONE SODIUM SUCCINATE 40 MG: 40 INJECTION, POWDER, FOR SOLUTION INTRAMUSCULAR; INTRAVENOUS at 14:20

## 2025-01-20 RX ADMIN — DEXTROSE 600 MG: 5 SOLUTION INTRAVENOUS at 15:04

## 2025-01-20 NOTE — PROGRESS NOTES
Getachew Kaye presents for [facility], offers no complaints. PIV placed with positive blood return. Tolerated treatment well with no complications.      Getachew Kaye does not have any more appointment scheduled at this time. Infusion treatment complete. Instructed to contact GI office for at home treatment. PIV removed.    AVS printed and reviewed.

## 2025-01-22 DIAGNOSIS — K50.018 CROHN'S DISEASE OF ILEUM, OTHER COMPLICATION (HCC): Primary | ICD-10-CM

## 2025-01-22 NOTE — TELEPHONE ENCOUNTER
Medication: Skyrizi OBI  Directions: inject 360mg every 8 weeks  Quantity: 360mg  Day Supply: 56  Insurance: [employer] [facility]  How Prior Auth was submitted: Rakel  Authorization Date range:12/22/2024 - 1/21/2026  Authorization Number: #[id number]  Pharmacy that fills med: [facility]    Letter in Media

## 2025-01-24 ENCOUNTER — CONSULT (OUTPATIENT)
Dept: UROLOGY | Facility: CLINIC | Age: 35
End: 2025-01-24
Payer: COMMERCIAL

## 2025-01-24 ENCOUNTER — TELEPHONE (OUTPATIENT)
Dept: UROLOGY | Facility: CLINIC | Age: 35
End: 2025-01-24

## 2025-01-24 VITALS
DIASTOLIC BLOOD PRESSURE: 72 MMHG | SYSTOLIC BLOOD PRESSURE: 120 MMHG | OXYGEN SATURATION: 98 % | HEIGHT: 62 IN | RESPIRATION RATE: 18 BRPM | WEIGHT: 209.4 LBS | HEART RATE: 96 BPM | TEMPERATURE: 98.6 F | BODY MASS INDEX: 38.53 KG/M2

## 2025-01-24 DIAGNOSIS — R31.29 MICROSCOPIC HEMATURIA: Primary | ICD-10-CM

## 2025-01-24 DIAGNOSIS — R31.9 HEMATURIA, UNSPECIFIED TYPE: ICD-10-CM

## 2025-01-24 LAB
BACTERIA UR QL AUTO: ABNORMAL /HPF
BILIRUB UR QL STRIP: NEGATIVE
CLARITY UR: CLEAR
COLOR UR: YELLOW
GLUCOSE UR STRIP-MCNC: NEGATIVE MG/DL
HGB UR QL STRIP.AUTO: NEGATIVE
KETONES UR STRIP-MCNC: NEGATIVE MG/DL
LEUKOCYTE ESTERASE UR QL STRIP: NEGATIVE
MUCOUS THREADS UR QL AUTO: ABNORMAL
NITRITE UR QL STRIP: NEGATIVE
NON-SQ EPI CELLS URNS QL MICRO: ABNORMAL /HPF
PH UR STRIP.AUTO: 6 [PH]
PROT UR STRIP-MCNC: ABNORMAL MG/DL
RBC #/AREA URNS AUTO: ABNORMAL /HPF
SL AMB  POCT GLUCOSE, UA: NORMAL
SL AMB LEUKOCYTE ESTERASE,UA: NORMAL
SL AMB POCT BILIRUBIN,UA: NORMAL
SL AMB POCT BLOOD,UA: NORMAL
SL AMB POCT CLARITY,UA: YELLOW
SL AMB POCT COLOR,UA: CLEAR
SL AMB POCT KETONES,UA: NORMAL
SL AMB POCT NITRITE,UA: NORMAL
SL AMB POCT PH,UA: 5
SL AMB POCT SPECIFIC GRAVITY,UA: 1.01
SL AMB POCT URINE PROTEIN: NORMAL
SL AMB POCT UROBILINOGEN: 0.2
SP GR UR STRIP.AUTO: 1.02 (ref 1–1.03)
UROBILINOGEN UR STRIP-ACNC: <2 MG/DL
WBC #/AREA URNS AUTO: ABNORMAL /HPF

## 2025-01-24 PROCEDURE — 81001 URINALYSIS AUTO W/SCOPE: CPT | Performed by: PHYSICIAN ASSISTANT

## 2025-01-24 PROCEDURE — 99204 OFFICE O/P NEW MOD 45 MIN: CPT | Performed by: PHYSICIAN ASSISTANT

## 2025-01-24 PROCEDURE — 81002 URINALYSIS NONAUTO W/O SCOPE: CPT | Performed by: PHYSICIAN ASSISTANT

## 2025-01-24 NOTE — PROGRESS NOTES
Name: Getachew Kaye      : 1990      MRN: [id number]  Encounter Provider: Stacie Rondon PA-C  Encounter Date: 2025   Encounter department: [facility] UROLOGY [place]  :  Assessment & Plan  Microscopic hematuria  Patient has no tobacco history, 34 years old, no gross hematuria.  We discussed this makes patient at low risk.  Would recommend ultrasound kidney and bladder at this time for complete evaluation and follow-up in office for next available cystoscopy.  Patient to call office should he experience gross hematuria  Orders:    POCT urine dip    US kidney and bladder; Future        History of Present Illness   Getachew Kaye is a 34 y.o. male who presents as a new patient for microscopic hematuria.  Patient saw GI recently who ordered urine testing showing 10-20 red blood cells per high-powered field.  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 Denies tobacco history. Denies family history of  malignancies.  Does have a history of epididymitis in  and was treated with antibiotics.  Denies recurrence since    Review of Systems   Constitutional:  Negative for activity change, appetite change, chills and fever.   HENT:  Negative for congestion and trouble swallowing.    Respiratory:  Negative for cough and shortness of breath.    Cardiovascular:  Negative for chest pain, palpitations and leg swelling.   Gastrointestinal:  Negative for abdominal pain, constipation, diarrhea, nausea and vomiting.   Genitourinary:  Negative for difficulty urinating, dysuria, flank pain, frequency, hematuria and urgency.   Musculoskeletal:  Negative for back pain and gait problem.   Skin:  Negative for wound.   Allergic/Immunologic: Negative for immunocompromised state.   Neurological:  Negative for dizziness and syncope.   Hematological:  Does not bruise/bleed easily.   Psychiatric/Behavioral:  Negative for confusion.    All other systems reviewed and are  "negative.         Objective   /72 (BP Location: Right arm, Patient Position: Sitting, Cuff Size: Standard)   Pulse 96   Temp 98.6 °F (37 °C) (Temporal)   Resp 18   Ht 5' 2\" (1.575 m)   Wt 95 kg (209 lb 6.4 oz)   SpO2 98%   BMI 38.30 kg/m²     Physical Exam  Constitutional:       Appearance: Normal appearance.   HENT:      Head: Normocephalic.      Nose: Nose normal.      Mouth/Throat:      Pharynx: Oropharynx is clear.   Eyes:      Extraocular Movements: Extraocular movements intact.      Pupils: Pupils are equal, round, and reactive to light.   Pulmonary:      Effort: Pulmonary effort is normal.   Musculoskeletal:         General: Normal range of motion.      Cervical back: Normal range of motion.   Skin:     General: Skin is warm.   Neurological:      General: No focal deficit present.      Mental Status: He is alert and oriented to person, place, and time. Mental status is at baseline.   Psychiatric:         Mood and Affect: Mood normal.         Behavior: Behavior normal.         Thought Content: Thought content normal.         Judgment: Judgment normal.          Results  No results found for: \"PSA\"  Lab Results   Component Value Date    CALCIUM 9.6 12/13/2024    K 4.5 12/13/2024    CO2 33 (H) 12/13/2024     12/13/2024    BUN 22 12/13/2024    CREATININE 0.80 12/13/2024     Lab Results   Component Value Date    WBC 9.92 12/13/2024    HGB 14.0 12/13/2024    HCT 42.7 12/13/2024    MCV 88 12/13/2024     12/13/2024       Office Urine Dip  Recent Results (from the past hour)   POCT urine dip    Collection Time: 01/24/25  9:28 AM   Result Value Ref Range    LEUKOCYTE ESTERASE,UA -     NITRITE,UA -     SL AMB POCT UROBILINOGEN 0.2     POCT URINE PROTEIN -      PH,UA 5.0     BLOOD,UA +     SPECIFIC GRAVITY,UA 1.015     KETONES,UA -     BILIRUBIN,UA -     GLUCOSE, UA -      COLOR,UA clear     CLARITY,UA yellow    ]      "

## 2025-01-24 NOTE — ASSESSMENT & PLAN NOTE
Patient has no tobacco history, 34 years old, no gross hematuria.  We discussed this makes patient at low risk.  Would recommend ultrasound kidney and bladder at this time for complete evaluation and follow-up in office for next available cystoscopy.  Patient to call office should he experience gross hematuria  Orders:    POCT urine dip    US kidney and bladder; Future

## 2025-02-07 ENCOUNTER — ANESTHESIA EVENT (OUTPATIENT)
Dept: GASTROENTEROLOGY | Facility: HOSPITAL | Age: 35
End: 2025-02-07
Payer: COMMERCIAL

## 2025-02-07 ENCOUNTER — TELEPHONE (OUTPATIENT)
Dept: GASTROENTEROLOGY | Facility: CLINIC | Age: 35
End: 2025-02-07

## 2025-02-07 ENCOUNTER — HOSPITAL ENCOUNTER (OUTPATIENT)
Dept: GASTROENTEROLOGY | Facility: HOSPITAL | Age: 35
Setting detail: OUTPATIENT SURGERY
End: 2025-02-07
Payer: COMMERCIAL

## 2025-02-07 ENCOUNTER — ANESTHESIA (OUTPATIENT)
Dept: GASTROENTEROLOGY | Facility: HOSPITAL | Age: 35
End: 2025-02-07
Payer: COMMERCIAL

## 2025-02-07 VITALS
HEIGHT: 63 IN | WEIGHT: 207.45 LBS | HEART RATE: 66 BPM | SYSTOLIC BLOOD PRESSURE: 121 MMHG | DIASTOLIC BLOOD PRESSURE: 56 MMHG | TEMPERATURE: 97 F | OXYGEN SATURATION: 98 % | BODY MASS INDEX: 36.76 KG/M2 | RESPIRATION RATE: 15 BRPM

## 2025-02-07 DIAGNOSIS — K21.9 GERD WITHOUT ESOPHAGITIS: ICD-10-CM

## 2025-02-07 DIAGNOSIS — K21.9 GASTROESOPHAGEAL REFLUX DISEASE: ICD-10-CM

## 2025-02-07 DIAGNOSIS — K50.018 CROHN'S DISEASE OF ILEUM, OTHER COMPLICATION (HCC): ICD-10-CM

## 2025-02-07 DIAGNOSIS — R11.11 VOMITING WITHOUT NAUSEA, UNSPECIFIED VOMITING TYPE: ICD-10-CM

## 2025-02-07 PROBLEM — E66.812 OBESITY, CLASS II, BMI 35-39.9: Status: ACTIVE | Noted: 2025-02-07

## 2025-02-07 PROCEDURE — 88305 TISSUE EXAM BY PATHOLOGIST: CPT | Performed by: PATHOLOGY

## 2025-02-07 PROCEDURE — 43239 EGD BIOPSY SINGLE/MULTIPLE: CPT | Performed by: INTERNAL MEDICINE

## 2025-02-07 RX ORDER — PROPOFOL 10 MG/ML
INJECTION, EMULSION INTRAVENOUS AS NEEDED
Status: DISCONTINUED | OUTPATIENT
Start: 2025-02-07 | End: 2025-02-07

## 2025-02-07 RX ORDER — PANTOPRAZOLE SODIUM 40 MG/1
40 TABLET, DELAYED RELEASE ORAL 2 TIMES DAILY
Qty: 180 TABLET | Refills: 3 | OUTPATIENT
Start: 2025-02-07

## 2025-02-07 RX ORDER — LIDOCAINE HYDROCHLORIDE 10 MG/ML
0.5 INJECTION, SOLUTION EPIDURAL; INFILTRATION; INTRACAUDAL; PERINEURAL ONCE AS NEEDED
Status: CANCELLED | OUTPATIENT
Start: 2025-02-07

## 2025-02-07 RX ORDER — LIDOCAINE HYDROCHLORIDE 10 MG/ML
INJECTION, SOLUTION EPIDURAL; INFILTRATION; INTRACAUDAL; PERINEURAL AS NEEDED
Status: DISCONTINUED | OUTPATIENT
Start: 2025-02-07 | End: 2025-02-07

## 2025-02-07 RX ORDER — LIDOCAINE HYDROCHLORIDE 10 MG/ML
0.5 INJECTION, SOLUTION EPIDURAL; INFILTRATION; INTRACAUDAL; PERINEURAL ONCE AS NEEDED
Status: DISCONTINUED | OUTPATIENT
Start: 2025-02-07 | End: 2025-02-11 | Stop reason: HOSPADM

## 2025-02-07 RX ORDER — SODIUM CHLORIDE, SODIUM LACTATE, POTASSIUM CHLORIDE, CALCIUM CHLORIDE 600; 310; 30; 20 MG/100ML; MG/100ML; MG/100ML; MG/100ML
50 INJECTION, SOLUTION INTRAVENOUS CONTINUOUS
Status: DISCONTINUED | OUTPATIENT
Start: 2025-02-07 | End: 2025-02-11 | Stop reason: HOSPADM

## 2025-02-07 RX ORDER — SODIUM CHLORIDE, SODIUM LACTATE, POTASSIUM CHLORIDE, CALCIUM CHLORIDE 600; 310; 30; 20 MG/100ML; MG/100ML; MG/100ML; MG/100ML
50 INJECTION, SOLUTION INTRAVENOUS CONTINUOUS
Status: CANCELLED | OUTPATIENT
Start: 2025-02-07

## 2025-02-07 RX ORDER — PANTOPRAZOLE SODIUM 40 MG/1
40 TABLET, DELAYED RELEASE ORAL 2 TIMES DAILY
Qty: 180 TABLET | Refills: 3 | Status: SHIPPED | OUTPATIENT
Start: 2025-02-07

## 2025-02-07 RX ADMIN — LIDOCAINE HYDROCHLORIDE 100 MG: 10 INJECTION, SOLUTION EPIDURAL; INFILTRATION; INTRACAUDAL; PERINEURAL at 08:19

## 2025-02-07 RX ADMIN — SODIUM CHLORIDE, SODIUM LACTATE, POTASSIUM CHLORIDE, AND CALCIUM CHLORIDE 50 ML/HR: .6; .31; .03; .02 INJECTION, SOLUTION INTRAVENOUS at 07:00

## 2025-02-07 RX ADMIN — PROPOFOL 40 MG: 10 INJECTION, EMULSION INTRAVENOUS at 08:20

## 2025-02-07 RX ADMIN — PROPOFOL 200 MG: 10 INJECTION, EMULSION INTRAVENOUS at 08:19

## 2025-02-07 NOTE — TELEPHONE ENCOUNTER
Patient was just seen on 1/17/2025  Please update the pharmacy insurance benefits  Please provide the   BIN NUMBER  PCN NUMBER  GROUP NUMBER  ID NUMBER  NAME OF INSURANCE  PROVIDER / HELP DESK PHONE NUMBER    Thank you    A message was sent to pt via [employer] as well.

## 2025-02-07 NOTE — ANESTHESIA POSTPROCEDURE EVALUATION
Post-Op Assessment Note    CV Status:  Stable    Pain management: adequate       Mental Status:  Sleepy   Hydration Status:  Euvolemic   PONV Controlled:  Controlled   Airway Patency:  Patent     Post Op Vitals Reviewed: Yes    No anethesia notable event occurred.            Last Filed PACU Vitals:  Vitals Value Taken Time   Temp     Pulse     BP     Resp     SpO2

## 2025-02-07 NOTE — ANESTHESIA PREPROCEDURE EVALUATION
Procedure:  EGD    Relevant Problems   CARDIO   (+) HLD (hyperlipidemia)      GI/HEPATIC   (+) Fatty liver   (+) GERD without esophagitis      PULMONARY   (+) MAYO (obstructive sleep apnea)      FEN/Gastrointestinal   (+) Crohn's disease of ileum, other complication (HCC)      Other   (+) Obesity, Class II, BMI 35-39.9      Denies recent fever, cough or other symptom of upper respiratory tract infection.    Confirmed NPO appropriate    Physical Exam    Airway    Mallampati score: II  TM Distance: >3 FB  Neck ROM: full     Dental   No notable dental hx     Cardiovascular      Pulmonary      Other Findings        Anesthesia Plan  ASA Score- 2     Anesthesia Type- IV sedation with anesthesia with ASA Monitors.         Additional Monitors:     Airway Plan:            Plan Factors-Exercise tolerance (METS): >4 METS.Exercise comment: Able to climb two flights of stairs without cardiopulmonary limitation.            Patient is not a current smoker.  Patient did not smoke on day of surgery.            Induction- intravenous.    Postoperative Plan-         Informed Consent- Anesthetic plan and risks discussed with patient.        NPO Status:  Vitals Value Taken Time   Date of last liquid 02/06/25 02/07/25 0658   Time of last liquid 2000 02/07/25 0658   Date of last solid 02/06/25 02/07/25 0658   Time of last solid 1900 02/07/25 0658

## 2025-02-07 NOTE — INTERVAL H&P NOTE
H&P reviewed. After examining the patient I find no changes in the patients condition since the H&P had been written.    Vitals:    02/07/25 0657   BP: 117/74   Pulse: 58   Resp: 22   Temp: 97.9 °F (36.6 °C)   SpO2: 98%

## 2025-02-08 ENCOUNTER — HOSPITAL ENCOUNTER (OUTPATIENT)
Dept: ULTRASOUND IMAGING | Facility: HOSPITAL | Age: 35
Discharge: HOME | End: 2025-02-08
Payer: COMMERCIAL

## 2025-02-08 DIAGNOSIS — R31.29 MICROSCOPIC HEMATURIA: ICD-10-CM

## 2025-02-08 PROCEDURE — 76775 US EXAM ABDO BACK WALL LIM: CPT

## 2025-02-10 ENCOUNTER — RESULTS FOLLOW-UP (OUTPATIENT)
Dept: GASTROENTEROLOGY | Facility: CLINIC | Age: 35
End: 2025-02-10

## 2025-02-10 PROCEDURE — 88305 TISSUE EXAM BY PATHOLOGIST: CPT | Performed by: PATHOLOGY

## 2025-02-10 NOTE — TELEPHONE ENCOUNTER
I spoke directly to patient. I notified him what was needed.  As per Josie, she sent a msg directly to patient via [employer] requesting this information.    Patient stated he will be coming in tomorrow with the card.  Please notify Louise or I when done so we can notify the RX  AUTH  Team.      Thank you

## 2025-02-11 ENCOUNTER — TELEPHONE (OUTPATIENT)
Dept: GASTROENTEROLOGY | Facility: CLINIC | Age: 35
End: 2025-02-11

## 2025-02-11 NOTE — TELEPHONE ENCOUNTER
PA for PANTOPRAZOLE SUBMITTED to [facility]"[employer]    via    [x][facility]-KEY: [id number]  []Surescripts-Case ID #   []Availity-Auth ID # NDC #   []Faxed to plan   []Other website   []Phone call Case ID #     [x]PA sent as URGENT    All office notes, labs and other pertaining documents and studies sent. Clinical questions answered. Awaiting determination from insurance company.     Turnaround time for your insurance to make a decision on your Prior Authorization can take 7-21 business days.

## 2025-02-11 NOTE — TELEPHONE ENCOUNTER
Routing to GI Medication PA Pod  The following Insurance information was needed to do PA for patients medication:    Patients stopped in with insurance card. Alessandro CODY made a copy    MEMBER ID [id number]    RXBIN [id number]    RXPCN: [id number]    RX GROUP [id number]  PHONE # [phone]

## 2025-02-12 NOTE — TELEPHONE ENCOUNTER
PA for PANTOPRAZOLE APPROVED     Date(s) approved 2/11/25- NO END DATE    Case #    Patient advised by          [x][employer] Message  []Phone call   []LMOM  []L/M to call office as no active Communication consent on file  []Unable to leave detailed message as VM not approved on Communication consent       Pharmacy advised by    [x]Fax  []Phone call    Approval letter scanned into Media Yes

## 2025-02-17 NOTE — TELEPHONE ENCOUNTER
"I spoke directly to patent. Results given:  \"The biopsies of the small intestine and the stomach were normal.  This is a good result. \"  "

## 2025-07-24 ENCOUNTER — TELEPHONE (OUTPATIENT)
Age: 35
End: 2025-07-24

## 2025-07-24 NOTE — TELEPHONE ENCOUNTER
Patient's mom is stating insurance is requesting a Patient Level Prior auth. Patient's mom provided contact phone number for insurance: [phone]. Patient's mom requesting auth processed urgently as patient is due for next injection soon.     Reason for call:   [x] Prior Auth  [] Other:     Caller:  [x] Patient  [] Pharmacy  Name:   Address:   Callback Number:     Medication: risankizumab-rzaa (SKYRIZI) 360 MG/2.4ML SOCT     Dose/Frequency: 2.4mL SQ q 8 weeks     Quantity: 2.4 mL    Ordering Provider:   [] PCP/Provider -   [x] Speciality/Provider - Gastro

## 2025-07-25 NOTE — TELEPHONE ENCOUNTER
Called insurance at [phone]. Patient is trying to fill medication to soon. He cannot fill until 7/30.

## 2025-07-25 NOTE — TELEPHONE ENCOUNTER
Called patient and reached voicemail. I left message making him aware to schedule delivery on or after 7/30

## (undated) DEVICE — GLOVE INDICATOR PI UNDERGLOVE SZ 7.5 BLUE

## (undated) DEVICE — INTENDED FOR TISSUE SEPARATION, AND OTHER PROCEDURES THAT REQUIRE A SHARP SURGICAL BLADE TO PUNCTURE OR CUT.: Brand: BARD-PARKER SAFETY BLADES SIZE 15, STERILE

## (undated) DEVICE — BETHLEHEM UNIVERSAL MINOR GEN: Brand: CARDINAL HEALTH

## (undated) DEVICE — 3000CC GUARDIAN II: Brand: GUARDIAN

## (undated) DEVICE — LIGHT HANDLE COVER SLEEVE DISP BLUE STELLAR

## (undated) DEVICE — ABDOMINAL PAD: Brand: DERMACEA

## (undated) DEVICE — GLOVE SRG BIOGEL ECLIPSE 7.5

## (undated) DEVICE — TUBING SUCTION 5MM X 12 FT

## (undated) DEVICE — POOLE SUCTION HANDLE: Brand: CARDINAL HEALTH

## (undated) DEVICE — GAUZE SPONGES,USP TYPE VII GAUZE, 12 PLY: Brand: CURITY

## (undated) DEVICE — CURITY PLAIN PACKING STRIP: Brand: CURITY

## (undated) DEVICE — DRAPE EQUIPMENT RF WAND